# Patient Record
Sex: MALE | Race: WHITE | Employment: FULL TIME | URBAN - METROPOLITAN AREA
[De-identification: names, ages, dates, MRNs, and addresses within clinical notes are randomized per-mention and may not be internally consistent; named-entity substitution may affect disease eponyms.]

---

## 2017-04-04 ENCOUNTER — RADIANT APPOINTMENT (OUTPATIENT)
Dept: GENERAL RADIOLOGY | Facility: CLINIC | Age: 22
End: 2017-04-04
Attending: NURSE PRACTITIONER
Payer: COMMERCIAL

## 2017-04-04 ENCOUNTER — OFFICE VISIT (OUTPATIENT)
Dept: FAMILY MEDICINE | Facility: CLINIC | Age: 22
End: 2017-04-04
Payer: COMMERCIAL

## 2017-04-04 VITALS
DIASTOLIC BLOOD PRESSURE: 81 MMHG | HEART RATE: 77 BPM | OXYGEN SATURATION: 98 % | WEIGHT: 139.2 LBS | SYSTOLIC BLOOD PRESSURE: 128 MMHG | HEIGHT: 72 IN | TEMPERATURE: 98.2 F | BODY MASS INDEX: 18.85 KG/M2

## 2017-04-04 DIAGNOSIS — M25.522 LEFT ELBOW PAIN: Primary | ICD-10-CM

## 2017-04-04 DIAGNOSIS — M25.522 LEFT ELBOW PAIN: ICD-10-CM

## 2017-04-04 DIAGNOSIS — S46.911A MUSCLE STRAIN OF RIGHT UPPER ARM, INITIAL ENCOUNTER: ICD-10-CM

## 2017-04-04 PROCEDURE — 73080 X-RAY EXAM OF ELBOW: CPT | Mod: LT

## 2017-04-04 PROCEDURE — 99203 OFFICE O/P NEW LOW 30 MIN: CPT | Performed by: NURSE PRACTITIONER

## 2017-04-04 RX ORDER — NAPROXEN 500 MG/1
500 TABLET ORAL 2 TIMES DAILY PRN
Qty: 30 TABLET | Refills: 1 | Status: SHIPPED | OUTPATIENT
Start: 2017-04-04 | End: 2017-07-08

## 2017-04-04 RX ORDER — CYCLOBENZAPRINE HCL 10 MG
5-10 TABLET ORAL 3 TIMES DAILY PRN
Qty: 30 TABLET | Refills: 1 | Status: SHIPPED | OUTPATIENT
Start: 2017-04-04 | End: 2017-07-08

## 2017-04-04 NOTE — PATIENT INSTRUCTIONS
I will let you know if the radiologist sees something that I do not. Take naproxen with food for pain. Muscle relaxer if pain is severe- this can make you tired.  I have ordered physical therapy which can help with massage, stretching and strengthening. If your symptoms persist or worsen, we will order further imaging for evaluation, please let me know.      Muscle Strain in the Extremities  A muscle strain is a stretching and tearing of muscle fibers. This causes pain, especially when you move that muscle. There may also be some swelling and bruising.  Home care    Keep the hurt area raised to reduce pain and swelling. This is especially important during the first 48 hours.    Apply an ice pack over the injured area for 15 to 20 minutes every 3 to 6 hours. You should do this for the first 24 to 48 hours. You can make an ice pack by filling a plastic bag that seals at the top with ice cubes and then wrapping it with a thin towel. Be careful not to injure your skin with the ice treatments. Ice should never be applied directly to skin. Continue the use of ice packs for relief of pain and swelling as needed. After 48 hours, apply heat (warm shower or warm bath) for 15 to 20 minutes several times a day, or alternate ice and heat.    You may use over-the-counter pain medicine to control pain, unless another medicine was prescribed. If you have chronic liver or kidney disease or ever had a stomach ulcer or GI bleeding, talk with your healthcare provider before using these medicines.    For leg strains: If crutches have been recommended, don t put full weight on the hurt leg until you can do so without pain. You can return to sports when you are able to hop and run on the injured leg without pain.  Follow-up care  Follow up with your healthcare provider, or as advised.  When to seek medical advice  Call your healthcare provider right away if any of these occur:    The toes of the injured leg become swollen, cold, blue,  numb, or tingly    Pain or swelling increases    4707-8323 [employer]. [address] [place] [address], [place] [address]. All rights reserved. This information is not intended as a substitute for professional medical care. Always follow your healthcare professional's instructions.

## 2017-04-04 NOTE — LETTER
[facility]  [address] [place] [address]  [phone]        April 4, 2017    Regarding:  Sivakmuar Cheri  [address] [facility] [address]              To Whom It May Concern;      Patient was seen in clinic today for an injury.    Restrictions: no lifting with left arm until pain subsides.    If you have questions or concerns please call the clinic.      Sincerely,    CARLOS ALBERTO Glover, FNP-BC/mp

## 2017-04-04 NOTE — LETTER
[facility]  [address]  [place] [address]  [phone]    April 4, 2017       Sivakumar Goodmanjessee  [address]   [place] [address]        Sivakumar     normal elbow xray, please follow treatment plan, follow up if symptoms persist or worsen.      Results for orders placed or performed in visit on 04/04/17   XR Elbow Left G/E 3 Views    Narrative    XR ELBOW LT G/E 3 VW 4/4/2017 12:57 PM    HISTORY: Pain.    COMPARISON: None.      Impression    IMPRESSION: No evidence of acute fracture or malalignment.    ABELINO ANNA MD     If you have any questions or concerns please call the clinic at [phone].    CARLOS ALBERTO Glover, FNP-BC/mp

## 2017-04-04 NOTE — MR AVS SNAPSHOT
After Visit Summary   4/4/2017    Sivakumar Alonzo    MRN: [id number]           Patient Information     Date Of Birth          1995        Visit Information        Provider Department      4/4/2017 12:20 PM Diana Torres NP [facility]        Today's Diagnoses     Left elbow pain    -  1    Muscle strain of right upper arm, initial encounter          Care Instructions    I will let you know if the radiologist sees something that I do not. Take naproxen with food for pain. Muscle relaxer if pain is severe- this can make you tired.  I have ordered physical therapy which can help with massage, stretching and strengthening. If your symptoms persist or worsen, we will order further imaging for evaluation, please let me know.      Muscle Strain in the Extremities  A muscle strain is a stretching and tearing of muscle fibers. This causes pain, especially when you move that muscle. There may also be some swelling and bruising.  Home care    Keep the hurt area raised to reduce pain and swelling. This is especially important during the first 48 hours.    Apply an ice pack over the injured area for 15 to 20 minutes every 3 to 6 hours. You should do this for the first 24 to 48 hours. You can make an ice pack by filling a plastic bag that seals at the top with ice cubes and then wrapping it with a thin towel. Be careful not to injure your skin with the ice treatments. Ice should never be applied directly to skin. Continue the use of ice packs for relief of pain and swelling as needed. After 48 hours, apply heat (warm shower or warm bath) for 15 to 20 minutes several times a day, or alternate ice and heat.    You may use over-the-counter pain medicine to control pain, unless another medicine was prescribed. If you have chronic liver or kidney disease or ever had a stomach ulcer or GI bleeding, talk with your healthcare provider before using these medicines.    For leg strains: If crutches have been  recommended, don t put full weight on the hurt leg until you can do so without pain. You can return to sports when you are able to hop and run on the injured leg without pain.  Follow-up care  Follow up with your healthcare provider, or as advised.  When to seek medical advice  Call your healthcare provider right away if any of these occur:    The toes of the injured leg become swollen, cold, blue, numb, or tingly    Pain or swelling increases    6489-4055 The "[employer] [address] [place], [place] [address]. All rights reserved. This information is not intended as a substitute for professional medical care. Always follow your healthcare professional's instructions.              Follow-ups after your visit        Additional Services     PHYSICAL THERAPY REFERRAL       *This therapy referral will be filtered to a centralized scheduling office at [facility] and the patient will receive a call to schedule an appointment at a [place] location most convenient for them. *     [facility] provides Physical Therapy evaluation and treatment and many specialty services across the [place] system.  If requesting a specialty program, please choose from the list below.    If you have not heard from the scheduling office within 2 business days, please call [phone] for all locations, with the exception of [place], please call [phone].  Treatment: Evaluation & Treatment  Special Instructions/Modalities: Left arm/ inner elbow- muscle strain/ pain  Special Programs: None    Please be aware that coverage of these services is subject to the terms and limitations of your health insurance plan.  Call member services at your health plan with any benefit or coverage questions.      **Note to Provider:  If you are referring outside of [place] for the therapy appointment, please list the name of the location in the  special instructions  above, print the referral and give  "to the patient to schedule the appointment.                  Follow-up notes from your care team     Return if symptoms worsen or fail to improve.      Who to contact     Normal or non-critical lab and imaging results will be communicated to you by MyChart, letter or phone within 4 business days after the clinic has received the results. If you do not hear from us within 7 days, please contact the clinic through MyChart or phone. If you have a critical or abnormal lab result, we will notify you by phone as soon as possible.  Submit refill requests through [employer] or call your pharmacy and they will forward the refill request to us. Please allow 3 business days for your refill to be completed.          If you need to speak with a  for additional information , please call: [phone]             Additional Information About Your Visit        Care EveryWhere ID     This is your Care EveryWhere ID. This could be used by other organizations to access your [facility] medical records  [id number]        Your Vitals Were     Pulse Temperature Height Pulse Oximetry BMI (Body Mass Index)       77 98.2  F (36.8  C) (Oral) 5' 11.85\" (1.825 m) 98% 18.96 kg/m2        Blood Pressure from Last 3 Encounters:   04/04/17 128/81    Weight from Last 3 Encounters:   04/04/17 139 lb 3.2 oz (63.1 kg)              We Performed the Following     PHYSICAL THERAPY REFERRAL          Today's Medication Changes          These changes are accurate as of: 4/4/17 12:56 PM.  If you have any questions, ask your nurse or doctor.               Start taking these medicines.        Dose/Directions    cyclobenzaprine 10 MG tablet   Commonly known as:  FLEXERIL   Used for:  Muscle strain of right upper arm, initial encounter   Started by:  Diana Torres NP        Dose:  5-10 mg   Take 0.5-1 tablets (5-10 mg) by mouth 3 times daily as needed for muscle spasms   Quantity:  30 tablet   Refills:  1       naproxen 500 MG tablet   Commonly " known as:  NAPROSYN   Used for:  Muscle strain of right upper arm, initial encounter   Started by:  Diana Torres NP        Dose:  500 mg   Take 1 tablet (500 mg) by mouth 2 times daily as needed for moderate pain   Quantity:  30 tablet   Refills:  1            Where to get your medicines      These medications were sent to [facility] - [address] [facility]  [address] [facility] [place] [address]     Phone:  [phone]     cyclobenzaprine 10 MG tablet    naproxen 500 MG tablet                Primary Care Provider Office Phone #    [facility] [phone]       No address on file        Thank you!     Thank you for choosing [facility]  for your care. Our goal is always to provide you with excellent care. Hearing back from our patients is one way we can continue to improve our services. Please take a few minutes to complete the written survey that you may receive in the mail after your visit with us. Thank you!             Your Updated Medication List - Protect others around you: Learn how to safely use, store and throw away your medicines at www.disposemymeds.org.          This list is accurate as of: 4/4/17 12:56 PM.  Always use your most recent med list.                   Brand Name Dispense Instructions for use    ALLEGRA PO          cyclobenzaprine 10 MG tablet    FLEXERIL    30 tablet    Take 0.5-1 tablets (5-10 mg) by mouth 3 times daily as needed for muscle spasms       naproxen 500 MG tablet    NAPROSYN    30 tablet    Take 1 tablet (500 mg) by mouth 2 times daily as needed for moderate pain

## 2017-04-04 NOTE — PROGRESS NOTES
Please call the patient with the results: normal elbow xray, please follow treatment plan, follow up if symptoms persist or worsen.    YUMIKO Dunaway

## 2017-04-04 NOTE — NURSING NOTE
"Chief Complaint   Patient presents with     Musculoskeletal Problem       Initial /81  Pulse 77  Temp 98.2  F (36.8  C) (Oral)  Ht 5' 11.85\" (1.825 m)  Wt 139 lb 3.2 oz (63.1 kg)  SpO2 98%  BMI 18.96 kg/m2 Estimated body mass index is 18.96 kg/(m^2) as calculated from the following:    Height as of this encounter: 5' 11.85\" (1.825 m).    Weight as of this encounter: 139 lb 3.2 oz (63.1 kg).  Medication Reconciliation: complete     Eunice Reeves MA  "

## 2017-07-08 ENCOUNTER — NURSE TRIAGE (OUTPATIENT)
Dept: NURSING | Facility: CLINIC | Age: 22
End: 2017-07-08

## 2017-07-08 ENCOUNTER — OFFICE VISIT (OUTPATIENT)
Dept: URGENT CARE | Facility: URGENT CARE | Age: 22
End: 2017-07-08
Payer: COMMERCIAL

## 2017-07-08 VITALS
HEART RATE: 106 BPM | WEIGHT: 127 LBS | BODY MASS INDEX: 17.3 KG/M2 | SYSTOLIC BLOOD PRESSURE: 114 MMHG | DIASTOLIC BLOOD PRESSURE: 75 MMHG | TEMPERATURE: 101.9 F

## 2017-07-08 DIAGNOSIS — Z91.89 AT HIGH RISK FOR TICK BORNE ILLNESS: ICD-10-CM

## 2017-07-08 DIAGNOSIS — J20.9 ACUTE BRONCHITIS WITH SYMPTOMS > 10 DAYS: Primary | ICD-10-CM

## 2017-07-08 DIAGNOSIS — R50.9 FEBRILE ILLNESS, ACUTE: ICD-10-CM

## 2017-07-08 PROCEDURE — 99213 OFFICE O/P EST LOW 20 MIN: CPT | Performed by: FAMILY MEDICINE

## 2017-07-08 RX ORDER — DOXYCYCLINE 100 MG/1
100 CAPSULE ORAL 2 TIMES DAILY WITH MEALS
Qty: 20 CAPSULE | Refills: 0 | Status: SHIPPED | OUTPATIENT
Start: 2017-07-08 | End: 2017-07-18

## 2017-07-08 ASSESSMENT — PAIN SCALES - GENERAL: PAINLEVEL: NO PAIN (0)

## 2017-07-08 NOTE — PROGRESS NOTES
SUBJECTIVE:                                                    Sivakumar Alonzo is a 22 year old male who presents to clinic today for the following health issues:      cough      Duration: 2wks    Description (location/character/radiation):     Intensity:  moderate    Accompanying signs and symptoms: none    History (similar episodes/previous evaluation): None    Precipitating or alleviating factors: None    Therapies tried and outcome: None     No thoughts of harming self or others     Accompanied by mom    Started 2 weeks ago with intially a mild cough and a sore throat for a few days  Thought got better a little bit but then got worse again after a few days  Cough persistent  Today complained of chills. Mom checked temperature and had fever  Patient was brought in to be seen    Non smoker.    [place] were [place] the [place] couple of weeks ago.   He cannot recall any tick bite.     Sinus congestion/sinus pain No  Wheezing: No  Chest pain or exertional shortness of breath: NO   Exposure to pertussis or pertussis like symptoms: No  Orthopnea, worsening edema, pnd: NO  Rash: NO  Tried OTC medications without relief  No hemoptysis.  Worsening symptoms hence patient came in to be seen     Problem list and histories reviewed & adjusted, as indicated.  Additional history: as documented    Problem list, Medication list, Allergies, and Medical/Social/Surgical histories reviewed in [facility] and updated as appropriate.    ROS:  Constitutional, HEENT, cardiovascular, pulmonary, gi and gu systems are negative, except as otherwise noted.    OBJECTIVE:                                                    /75  Pulse 106  Temp 101.9  F (38.8  C) (Tympanic)  Wt 127 lb (57.6 kg)  BMI 17.3 kg/m2  Body mass index is 17.3 kg/(m^2).  GENERAL: healthy, alert and no distress  EYES: pink palpebral conjunctiva, anicteric sclera  ENT: midline nasal septum normal ear exam. congested sinuses.   Mouth: moist buccal mucosa  nonhyperemic posterior pharyngeal wall. No tonsillar enlargement or cellulitis  NECK: mild anterior cervical, no asymmetry, masses, or scars and thyroid normal to palpation  RESP: lungs clear to auscultation - No  rales, rhonchi or wheezes    CV: regular rate and rhythm, normal S1 S2, no S3 or S4,  No murmurs, click or rub  SKIN: no visible rashes noted  Pscyh: Appropriate mood and affect  MS: no gross musculoskeletal defects noted    Diagnostic Test Results:  none      ASSESSMENT/PLAN:                                                        ICD-10-CM    1. Acute bronchitis with symptoms > 10 days J20.9 doxycycline Monohydrate 100 MG CAPS   2. Febrile illness, acute R50.9 doxycycline Monohydrate 100 MG CAPS   3. At high risk for tick borne illness Z91.89      Bronchitis double sickening phenomenon > 10 days now with fevers concern for bacterial respiratory infection  However also increased risk of tick borne illness given recent camping  Offered and recommended chest xray and additional labs patient declined and opted for empiric treatment   Given allergy profile, lung respiratory infection and risk for tick borne illness chose doxycycline as antibiotic of choice  Prescribed with doxycycline  Aware of the risks of GI intolerance, GERD, GI complications and photosensitivity with doxycycline.   recommend chest xray, offered today, declined.   Adverse reactions of medications discussed.  Over the counter medications discussed.   Aware to come back in if with worsening symptoms or if no relief despite treatment plan  Patient voiced understanding and had no further questions.     MD Bonnie Antunez MD  [facility]

## 2017-07-08 NOTE — MR AVS SNAPSHOT
"              After Visit Summary   7/8/2017    Sivakumar Alonzo    MRN: [id number]           Patient Information     Date Of Birth          1995        Visit Information        Provider Department      7/8/2017 1:05 PM Bonnie Dougherty MD [facility]        Today's Diagnoses     Acute bronchitis with symptoms > 10 days    -  1    Febrile illness, acute        At high risk for tick borne illness           Follow-ups after your visit        Your next 10 appointments already scheduled     Jul 10, 2017 11:30 AM CDT   New Visit with Celena Coates OD   [facility] ([facility])    [address] [place] [address] [phone]              Who to contact     If you have questions or need follow up information about today's clinic visit or your schedule please contact [facility] directly at [phone].  Normal or non-critical lab and imaging results will be communicated to you by MyChart, letter or phone within 4 business days after the clinic has received the results. If you do not hear from us within 7 days, please contact the clinic through MyChart or phone. If you have a critical or abnormal lab result, we will notify you by phone as soon as possible.  Submit refill requests through [employer] or call your pharmacy and they will forward the refill request to us. Please allow 3 business days for your refill to be completed.          Additional Information About Your Visit        MyChart Information     [employer] lets you send messages to your doctor, view your test results, renew your prescriptions, schedule appointments and more. To sign up, go to [email or web address] . Click on \"Log in\" on the left side of the screen, which will take you to the Welcome page. Then click on \"Sign up Now\" on the right side of the page.     You will be asked to enter the access code listed below, as well as some personal information. Please follow the " directions to create your username and password.     Your access code is: [email or web address]  Expires: 10/6/2017  1:36 PM     Your access code will  in 90 days. If you need help or a new code, please call your [facility] or [phone].        Care EveryWhere ID     This is your Care EveryWhere ID. This could be used by other organizations to access your [facility] medical records  [id number]        Your Vitals Were     Pulse Temperature BMI (Body Mass Index)             106 101.9  F (38.8  C) (Tympanic) 17.3 kg/m2          Blood Pressure from Last 3 Encounters:   17 114/75   17 128/81    Weight from Last 3 Encounters:   17 127 lb (57.6 kg)   17 139 lb 3.2 oz (63.1 kg)              Today, you had the following     No orders found for display         Today's Medication Changes          These changes are accurate as of: 17  1:36 PM.  If you have any questions, ask your nurse or doctor.               Start taking these medicines.        Dose/Directions    doxycycline Monohydrate 100 MG Caps   Used for:  Acute bronchitis with symptoms > 10 days, Febrile illness, acute   Started by:  Bonnie Dougherty MD        Dose:  100 mg   Take 1 capsule (100 mg) by mouth 2 times daily (with meals) for 10 days Increases risk of heartburn and also sun sensitivity or sun burn.   Quantity:  20 capsule   Refills:  0            Where to get your medicines      [id number] [address] [facility]  [address] [place] [address]    Hours:  24-hours Phone:  [phone]     doxycycline Monohydrate 100 MG Caps                Primary Care Provider Office Phone #    [facility] [phone]       No address on file        Equal Access to Services     SAMUEL RAMOS AH: Jacqueline Prescott, waaxda luqadaha, qaybta kaalmada omar, janey marte.  [facility] [phone].    ATENCIÓN: Si shawanda gudino, tiene a canela disposición servicios gratuitos de asistencia lingüística. Sagar dudley [phone].    We comply with applicable federal civil rights laws and [place] laws. We do not discriminate on the basis of race, color, national origin, age, disability sex, sexual orientation or gender identity.            Thank you!     [facility] you [facility]  [facility]. Our goal is always to provide you with excellent care. Hearing back from our patients is one way we can continue to improve our services. Please take a few minutes to complete the written survey that you may receive in the mail after your visit with us. Thank you!             Your Updated Medication List - Protect others around you: Learn how to safely use, store and throw away your medicines at www.disposemymeds.org.          This list is accurate as of: 7/8/17  1:36 PM.  Always use your most recent med list.                   Brand Name Dispense Instructions for use Diagnosis    ALLEGRA PO           doxycycline Monohydrate 100 MG Caps     20 capsule    Take 1 capsule (100 mg) by mouth 2 times daily (with meals) for 10 days Increases risk of heartburn and also sun sensitivity or sun burn.    Acute bronchitis with symptoms > 10 days, Febrile illness, acute

## 2017-07-08 NOTE — NURSING NOTE
"Chief Complaint   Patient presents with     Cough       Initial /75  Pulse 106  Temp 101.9  F (38.8  C) (Tympanic)  Wt 127 lb (57.6 kg)  BMI 17.3 kg/m2 Estimated body mass index is 17.3 kg/(m^2) as calculated from the following:    Height as of 4/4/17: 5' 11.85\" (1.825 m).    Weight as of this encounter: 127 lb (57.6 kg).  Medication Reconciliation: complete  Ibis Pollard CMA      "

## 2018-01-06 ENCOUNTER — OFFICE VISIT (OUTPATIENT)
Dept: URGENT CARE | Facility: URGENT CARE | Age: 23
End: 2018-01-06
Payer: COMMERCIAL

## 2018-01-06 VITALS
TEMPERATURE: 101 F | OXYGEN SATURATION: 97 % | WEIGHT: 130 LBS | SYSTOLIC BLOOD PRESSURE: 120 MMHG | HEART RATE: 95 BPM | BODY MASS INDEX: 17.7 KG/M2 | DIASTOLIC BLOOD PRESSURE: 68 MMHG

## 2018-01-06 DIAGNOSIS — J02.0 STREP THROAT: ICD-10-CM

## 2018-01-06 DIAGNOSIS — J02.9 SORETHROAT: Primary | ICD-10-CM

## 2018-01-06 LAB
DEPRECATED S PYO AG THROAT QL EIA: ABNORMAL
SPECIMEN SOURCE: ABNORMAL

## 2018-01-06 PROCEDURE — 99213 OFFICE O/P EST LOW 20 MIN: CPT | Performed by: FAMILY MEDICINE

## 2018-01-06 PROCEDURE — 87880 STREP A ASSAY W/OPTIC: CPT | Performed by: FAMILY MEDICINE

## 2018-01-06 RX ORDER — AZITHROMYCIN 250 MG/1
TABLET, FILM COATED ORAL
Qty: 6 TABLET | Refills: 0 | Status: SHIPPED | OUTPATIENT
Start: 2018-01-06 | End: 2018-01-28

## 2018-01-06 NOTE — MR AVS SNAPSHOT
"              After Visit Summary   2018    Sivakumar Alonzo    MRN: [id number]           Patient Information     Date Of Birth          1995        Visit Information        Provider Department      2018 11:30 AM Bonnie Dougherty MD [facility]        Today's Diagnoses     Sorethroat    -  1    Strep throat           Follow-ups after your visit        Who to contact     If you have questions or need follow up information about today's clinic visit or your schedule please contact [facility] directly at [phone].  Normal or non-critical lab and imaging results will be communicated to you by [employer], letter or phone within 4 business days after the clinic has received the results. If you do not hear from us within 7 days, please contact the clinic through [employer] or phone. If you have a critical or abnormal lab result, we will notify you by phone as soon as possible.  Submit refill requests through [employer] or call your pharmacy and they will forward the refill request to us. Please allow 3 business days for your refill to be completed.          Additional Information About Your Visit        MyChart Information     [employer] lets you send messages to your doctor, view your test results, renew your prescriptions, schedule appointments and more. To sign up, go to [email or web address] . Click on \"Log in\" on the left side of the screen, which will take you to the Welcome page. Then click on \"Sign up Now\" on the right side of the page.     You will be asked to enter the access code listed below, as well as some personal information. Please follow the directions to create your username and password.     Your access code is: [email or web address]  Expires: 2018 12:09 PM     Your access code will  in 90 days. If you need help or a new code, please call your [facility] or [phone].        Care EveryWhere ID     This is your Care EveryWhere ID. This could be used by " other organizations to access your [place] medical records  [id number]        Your Vitals Were     Pulse Temperature Pulse Oximetry BMI (Body Mass Index)          95 101  F (38.3  C) (Tympanic) 97% 17.7 kg/m2         Blood Pressure from Last 3 Encounters:   01/06/18 120/68   07/08/17 114/75   04/04/17 128/81    Weight from Last 3 Encounters:   01/06/18 130 lb (59 kg)   07/08/17 127 lb (57.6 kg)   04/04/17 139 lb 3.2 oz (63.1 kg)              We Performed the Following     Strep, Rapid Screen          Today's Medication Changes          These changes are accurate as of: 1/6/18 12:09 PM.  If you have any questions, ask your nurse or doctor.               Start taking these medicines.        Dose/Directions    azithromycin 250 MG tablet   Commonly known as:  ZITHROMAX   Used for:  Strep throat   Started by:  Bonnie Dougherty MD        2 tablets the first day, then 1 tablet daily for the next 4 days   Quantity:  6 tablet   Refills:  0            Where to get your medicines      These medications were sent to [id number] - [facility] [address] [facility]  [address] [facility] [address]    Hours:  24-hours Phone:  [phone]     azithromycin 250 MG tablet                Primary Care Provider Office Phone # Fax #    [facility] [phone] [address] [facility] [address]        Equal Access to Services     SAMUEL RAMOS AH: Hadii aad ku hadasho Soomaali, waaxda luqadaha, qaybta kaalmada adeegyada, waxjean saima marte. So [facility] [phone].    ATENCIÓN: Si habla shahab, tiene a canela disposición servicios gratuitos de asistencia lingüística. Llame al [phone].    We comply with applicable federal civil rights laws and [place] laws. We do not discriminate on the basis of race, color, national origin, age, disability, sex, sexual orientation, or gender identity.            Thank  you!     Thank you for choosing [facility]  for your care. Our goal is always to provide you with excellent care. Hearing back from our patients is one way we can continue to improve our services. Please take a few minutes to complete the written survey that you may receive in the mail after your visit with us. Thank you!             Your Updated Medication List - Protect others around you: Learn how to safely use, store and throw away your medicines at www.disposemymeds.org.          This list is accurate as of: 1/6/18 12:09 PM.  Always use your most recent med list.                   Brand Name Dispense Instructions for use Diagnosis    ALLEGRA PO           azithromycin 250 MG tablet    ZITHROMAX    6 tablet    2 tablets the first day, then 1 tablet daily for the next 4 days    Strep throat

## 2018-01-06 NOTE — LETTER
[facility]  [address] [facility] [address]  Phone: [phone]    January 6, 2018        Sivakumar Alonzo  [address]  [facility] [address]          To whom it may concern:    RE: Sivakumar Alonzo    Patient was seen and treated today at our clinic and missed work.  Recommend staying home today and tomorrow.  May go back on Monday 1/8 or later  if no more fevers for 24 hours without the help of tylenol or ibuprofen.     Please contact me for questions or concerns.      Sincerely,        Bonnie Dougherty MD

## 2018-01-06 NOTE — PROGRESS NOTES
SUBJECTIVE:                                                    Sivakumar Alonzo is a 22 year old male who presents to clinic today for the following health issues:      RESPIRATORY SYMPTOMS      Duration: 2 days    Description  sore throat and cough    Severity: moderate    Accompanying signs and symptoms: None    History (predisposing factors):  none    Precipitating or alleviating factors: None    Therapies tried and outcome:  none      No thoughts of harming self or others   Was just mild 2 days ago  Yesterday got progressively worse  Fever started as well  fever  - Yes  cough  -No  ill contacts - No  able to swallow liquids and solids -YES  other symptoms above  Rash: No  Has tried over the counter medications no relief  because of persistence, patient came in to be seen.    ROS:  denies any exertional chest pain or shortness of breath  denies any unusual rash or joint swelling  denies post-tussive emesis or pertussis like symptoms  Negative for constitutional, eye, ear, nose, throat, skin, respiratory, cardiac, and gastrointestinal other than those outlined in the HPI.    PMH: chart reviewed  FH: chart reviewed    SH: chart reviewed and as above   Physical Exam:   /68  Pulse 95  Temp 101  F (38.3  C) (Tympanic)  Wt 130 lb (59 kg)  SpO2 97%  BMI 17.7 kg/m2  General : Awake Alert not in any acute cardiorespiratory distress  Head:       Normocephalic Atraumatic  Eyes:    Pupils equally reactive to light and accomodation. Sclera not icteric.   ENT:   midline nasal septum, mild nasal congestion, sinuses non-tender  left ear: no tragal tenderness, no mastoid tenderness, normal EAC, normal TM  right ear: left ear: no tragal tenderness, no mastoid tenderness, normal EAC, normal TM  mouth moist buccal mucosa, Yes hyperemic posterior pharyngeal wall, no trismus  tonsils: bilateral tonsil abnormal with erythematous grade 1 no exudate  anterior cervical nodes: Yes tender  posterior cervical nodes: No   palpable  Heart:  Regular in rate and rhythm, no murmurs rubs or gallops  Lungs: Symmetrical Chest Expansion, no retractions, clear breath sounds  Abdomen: soft, no hepatosplenomegally  Psych: Appropriate mood and affect. Pleasant  Skin: patient undressed to level of his/her comfort. No visible concerning lesions.    Labs: Strep positive     ICD-10-CM    1. Sorethroat J02.9 Strep, Rapid Screen   2. Strep throat J02.0 azithromycin (ZITHROMAX) 250 MG tablet     Prescribed with zithromax   supportive treatment: advised supportive treatment, Advised to come back in if with any worsening symptoms or if not better despite supportive measures. Especially if with any worsening sore throat, inability to eat or drink or swallow, or trismus. Symptoms of peritonsillar abscess discussed. Patient voiced understanding.  adverse reactions of medication discussed  OTC medications discussed  advised to come back in right away if with any worsening symptoms or if with no relief despite treatment plan  patient voiced understanding and had no further questions at this time.

## 2018-01-28 ENCOUNTER — OFFICE VISIT (OUTPATIENT)
Dept: URGENT CARE | Facility: URGENT CARE | Age: 23
End: 2018-01-28
Payer: COMMERCIAL

## 2018-01-28 VITALS
OXYGEN SATURATION: 95 % | DIASTOLIC BLOOD PRESSURE: 84 MMHG | TEMPERATURE: 98.3 F | SYSTOLIC BLOOD PRESSURE: 139 MMHG | HEART RATE: 98 BPM | BODY MASS INDEX: 18.25 KG/M2 | WEIGHT: 134 LBS

## 2018-01-28 DIAGNOSIS — R68.89 FLU-LIKE SYMPTOMS: Primary | ICD-10-CM

## 2018-01-28 PROCEDURE — 99213 OFFICE O/P EST LOW 20 MIN: CPT | Performed by: NURSE PRACTITIONER

## 2018-01-28 RX ORDER — OSELTAMIVIR PHOSPHATE 75 MG/1
75 CAPSULE ORAL 2 TIMES DAILY
Qty: 10 CAPSULE | Refills: 0 | Status: SHIPPED | OUTPATIENT
Start: 2018-01-28 | End: 2018-02-02

## 2018-01-28 RX ORDER — FEXOFENADINE HCL 180 MG/1
180 TABLET ORAL
COMMUNITY
Start: 2010-10-09 | End: 2023-05-16

## 2018-01-28 NOTE — LETTER
[facility]  [address]  [facility] [address]  Phone: [phone]    January 28, 2018        Sivakumar Alonzo  [address] [facility] [address]          To whom it may concern:    RE: Sivakumar Alonzo    Patient was seen and treated today at our clinic. Please allow him to rest home 1/29/2018.   Patient may return to work 1/30/2018  with no restrictions    Please contact me for questions or concerns.      Sincerely,        Zita Sanford NP

## 2018-01-28 NOTE — PATIENT INSTRUCTIONS
The Flu (Influenza)     The virus that causes the flu spreads through the air in droplets when someone who has the flu coughs, sneezes, laughs, or talks.   The flu (influenza) is an infection that affects your respiratory tract. This tract is made up of your mouth, nose, and lungs, and the passages between them. Unlike a cold, the flu can make you very ill. And it can lead to pneumonia, a serious lung infection. The flu can have serious complications and even cause death.  Who is at risk for the flu?  Anyone can get the flu. But you are more likely to become infected if you:    Have a weakened immune system    Work in a healthcare setting where you may be exposed to flu germs    Live or work with someone who has the flu    Haven t had an annual flu shot  How does the flu spread?  The flu is caused by a virus. The virus spreads through the air in droplets when someone who has the flu coughs, sneezes, laughs, or talks. You can become infected when you inhale these viruses directly. You can also become infected when you touch a surface on which the droplets have landed and then transfer the germs to your eyes, nose, or mouth. Touching used tissues, or sharing utensils, drinking glasses, or a toothbrush from an infected person can expose you to flu viruses, too.  What are the symptoms of the flu?  Flu symptoms tend to come on quickly and may last a few days to a few weeks. They include:    Fever usually higher than 100.4 F  (38 C) and chills    Sore throat and headache    Dry cough    Runny nose    Tiredness and weakness    Muscle aches  Who is at risk for flu complications?  For some people, the flu can be very serious. The risk for complications is greater for:    Children younger than age 5    Adults ages 65 and older    People with a chronic illness such as diabetes or heart, kidney, or lung disease    People who live in a nursing home or long-term care facility   How is the flu treated?  The flu usually gets  better after 7 days or so. In some cases, your healthcare provider may prescribe an antiviral medicine. This may help you get well a little sooner. For the medicine to help, you need to take it as soon as possible (ideally within 48 hours) after your symptoms start. If you develop pneumonia or other serious illness, you may need to stay in the hospital.  Easing flu symptoms    Drink lots of fluids such as water, juice, and warm soup. A good rule is to drink enough so that you urinate your normal amount.    Get plenty of rest.    Ask your healthcare provider what to take for fever and pain.    Call your provider if your fever is 100.4 F (38 C) or higher, or you become dizzy, lightheaded, or short of breath.  Taking steps to protect others    Wash your hands often, especially after coughing or sneezing. Or clean your hands with an alcohol-based hand  containing at least 60% alcohol.    Cough or sneeze into a tissue. Then throw the tissue away and wash your hands. If you don t have a tissue, cough and sneeze into your elbow.    Stay home until at least 24 hours after you no longer have a fever or chills. Be sure the fever isn t being hidden by fever-reducing medicine.    Don t share food, utensils, drinking glasses, or a toothbrush with others.    Ask your healthcare provider if others in your household should get antiviral medicine to help them avoid infection.  How can the flu be prevented?    One of the best ways to avoid the flu is to get a flu vaccine each year. The virus that causes the flu changes from year to year. For that reason, healthcare providers recommend getting the flu vaccine each year, as soon as it's available in your area. The vaccine is given as a shot. Your healthcare provider can tell you which vaccine is right for you. A nasal spray is also available but is not recommended for the 4668-9653 flu season. The CDC says this is because the nasal spray did not seem to protect against the flu  over the last several flu seasons. In the past, it was meant for people ages 2 to 49.    Wash your hands often. Frequent handwashing is a proven way to help prevent infection.    Carry an alcohol-based hand gel containing at least 60% alcohol. Use it when you can't use soap and water. Then wash your hands as soon as you can.    Avoid touching your eyes, nose, and mouth.    At home and work, clean phones, computer keyboards, and toys often with disinfectant wipes.    If possible, avoid close contact with others who have the flu or symptoms of the flu.  Handwashing tips  Handwashing is one of the best ways to prevent many common infections. If you are caring for or visiting someone with the flu, wash your hands each time you enter and leave the room. Follow these steps:    Use warm water and plenty of soap. Rub your hands together well.    Clean the whole hand, including under your nails, between your fingers, and up the wrists.    Wash for at least 15 seconds.    Rinse, letting the water run down your fingers, not up your wrists.    Dry your hands well. Use a paper towel to turn off the faucet and open the door.  Using alcohol-based hand   Alcohol-based hand  are also a good choice. Use them when you can't use soap and water. Follow these steps:    Squeeze about a tablespoon of gel into the palm of one hand.    Rub your hands together briskly, cleaning the backs of your hands, the palms, between your fingers, and up the wrists.    Rub until the gel is gone and your hands are completely dry.  Preventing the flu in healthcare settings  The flu is a special concern for people in hospitals and long-term care facilities. To help prevent the spread of flu, many hospitals and nursing homes take these steps:    Healthcare providers wash their hands or use an alcohol-based hand  before and after treating each patient.    People with the flu have private rooms and bathrooms or share a room with someone  with the same infection.    People who are at high risk for the flu but don't have it are encouraged to get the flu and pneumonia vaccines.    All healthcare workers are encouraged or required to get flu shots.   Date Last Reviewed: 12/1/2016 2000-2017 [employer]. [address] [place] [address]. All rights reserved. This information is not intended as a substitute for professional medical care. Always follow your healthcare professional's instructions.

## 2018-01-28 NOTE — NURSING NOTE
"Chief Complaint   Patient presents with     Flu Symptoms     1-1.5 weeks of cough       Initial /84 (BP Location: Left arm, Patient Position: Chair, Cuff Size: Adult Large)  Pulse 98  Temp 98.3  F (36.8  C) (Oral)  Wt 134 lb (60.8 kg)  SpO2 95%  BMI 18.25 kg/m2 Estimated body mass index is 18.25 kg/(m^2) as calculated from the following:    Height as of 4/4/17: 5' 11.85\" (1.825 m).    Weight as of this encounter: 134 lb (60.8 kg).  Medication Reconciliation: complete   Lubna Minor CMA      "

## 2018-01-28 NOTE — MR AVS SNAPSHOT
After Visit Summary   1/28/2018    Sivakumar Alonzo    MRN: [id number]           Patient Information     Date Of Birth          1995        Visit Information        Provider Department      1/28/2018 3:50 PM Zita Sanford NP [facility]        Today's Diagnoses     Flu-like symptoms    -  1      Care Instructions      The Flu (Influenza)     The virus that causes the flu spreads through the air in droplets when someone who has the flu coughs, sneezes, laughs, or talks.   The flu (influenza) is an infection that affects your respiratory tract. This tract is made up of your mouth, nose, and lungs, and the passages between them. Unlike a cold, the flu can make you very ill. And it can lead to pneumonia, a serious lung infection. The flu can have serious complications and even cause death.  Who is at risk for the flu?  Anyone can get the flu. But you are more likely to become infected if you:    Have a weakened immune system    Work in a healthcare setting where you may be exposed to flu germs    Live or work with someone who has the flu    Haven t had an annual flu shot  How does the flu spread?  The flu is caused by a virus. The virus spreads through the air in droplets when someone who has the flu coughs, sneezes, laughs, or talks. You can become infected when you inhale these viruses directly. You can also become infected when you touch a surface on which the droplets have landed and then transfer the germs to your eyes, nose, or mouth. Touching used tissues, or sharing utensils, drinking glasses, or a toothbrush from an infected person can expose you to flu viruses, too.  What are the symptoms of the flu?  Flu symptoms tend to come on quickly and may last a few days to a few weeks. They include:    Fever usually higher than 100.4 F  (38 C) and chills    Sore throat and headache    Dry cough    Runny nose    Tiredness and weakness    Muscle aches  Who is at risk for flu  complications?  For some people, the flu can be very serious. The risk for complications is greater for:    Children younger than age 5    Adults ages 65 and older    People with a chronic illness such as diabetes or heart, kidney, or lung disease    People who live in a nursing home or long-term care facility   How is the flu treated?  The flu usually gets better after 7 days or so. In some cases, your healthcare provider may prescribe an antiviral medicine. This may help you get well a little sooner. For the medicine to help, you need to take it as soon as possible (ideally within 48 hours) after your symptoms start. If you develop pneumonia or other serious illness, you may need to stay in the hospital.  Easing flu symptoms    Drink lots of fluids such as water, juice, and warm soup. A good rule is to drink enough so that you urinate your normal amount.    Get plenty of rest.    Ask your healthcare provider what to take for fever and pain.    Call your provider if your fever is 100.4 F (38 C) or higher, or you become dizzy, lightheaded, or short of breath.  Taking steps to protect others    Wash your hands often, especially after coughing or sneezing. Or clean your hands with an alcohol-based hand  containing at least 60% alcohol.    Cough or sneeze into a tissue. Then throw the tissue away and wash your hands. If you don t have a tissue, cough and sneeze into your elbow.    Stay home until at least 24 hours after you no longer have a fever or chills. Be sure the fever isn t being hidden by fever-reducing medicine.    Don t share food, utensils, drinking glasses, or a toothbrush with others.    Ask your healthcare provider if others in your household should get antiviral medicine to help them avoid infection.  How can the flu be prevented?    One of the best ways to avoid the flu is to get a flu vaccine each year. The virus that causes the flu changes from year to year. For that reason, healthcare  providers recommend getting the flu vaccine each year, as soon as it's available in your area. The vaccine is given as a shot. Your healthcare provider can tell you which vaccine is right for you. A nasal spray is also available but is not recommended for the 2517-1951 flu season. The CDC says this is because the nasal spray did not seem to protect against the flu over the last several flu seasons. In the past, it was meant for people ages 2 to 49.    Wash your hands often. Frequent handwashing is a proven way to help prevent infection.    Carry an alcohol-based hand gel containing at least 60% alcohol. Use it when you can't use soap and water. Then wash your hands as soon as you can.    Avoid touching your eyes, nose, and mouth.    At home and work, clean phones, computer keyboards, and toys often with disinfectant wipes.    If possible, avoid close contact with others who have the flu or symptoms of the flu.  Handwashing tips  Handwashing is one of the best ways to prevent many common infections. If you are caring for or visiting someone with the flu, wash your hands each time you enter and leave the room. Follow these steps:    Use warm water and plenty of soap. Rub your hands together well.    Clean the whole hand, including under your nails, between your fingers, and up the wrists.    Wash for at least 15 seconds.    Rinse, letting the water run down your fingers, not up your wrists.    Dry your hands well. Use a paper towel to turn off the faucet and open the door.  Using alcohol-based hand   Alcohol-based hand  are also a good choice. Use them when you can't use soap and water. Follow these steps:    Squeeze about a tablespoon of gel into the palm of one hand.    Rub your hands together briskly, cleaning the backs of your hands, the palms, between your fingers, and up the wrists.    Rub until the gel is gone and your hands are completely dry.  Preventing the flu in healthcare settings  The flu  "is a special concern for people in hospitals and long-term care facilities. To help prevent the spread of flu, many hospitals and nursing homes take these steps:    Healthcare providers wash their hands or use an alcohol-based hand  before and after treating each patient.    People with the flu have private rooms and bathrooms or share a room with someone with the same infection.    People who are at high risk for the flu but don't have it are encouraged to get the flu and pneumonia vaccines.    All healthcare workers are encouraged or required to get flu shots.   Date Last Reviewed: 12/1/2016 2000-2017 [employer]. [address] [place] [address]. All rights reserved. This information is not intended as a substitute for professional medical care. Always follow your healthcare professional's instructions.                Follow-ups after your visit        Who to contact     If you have questions or need follow up information about today's clinic visit or your schedule please contact [facility] directly at [phone].  Normal or non-critical lab and imaging results will be communicated to you by Bazaarvoicehart, letter or phone within 4 business days after the clinic has received the results. If you do not hear from us within 7 days, please contact the clinic through OneFoldt or phone. If you have a critical or abnormal lab result, we will notify you by phone as soon as possible.  Submit refill requests through MoBank or call your pharmacy and they will forward the refill request to us. Please allow 3 business days for your refill to be completed.          Additional Information About Your Visit        MoBank Information     MoBank lets you send messages to your doctor, view your test results, renew your prescriptions, schedule appointments and more. To sign up, go to [email or web address] . Click on \"Log in\" on the left side of the screen, which will take you to " "the Welcome page. Then click on \"Sign up Now\" on the right side of the page.     You will be asked to enter the access code listed below, as well as some personal information. Please follow the directions to create your username and password.     Your access code is: [email or web address]  Expires: 2018 12:09 PM     Your access code will  in 90 days. If you need help or a new code, please call your [facility] or [phone].        Care EveryWhere ID     This is your Care EveryWhere ID. This could be used by other organizations to access your [place] medical records  [id number]        Your Vitals Were     Pulse Temperature Pulse Oximetry BMI (Body Mass Index)          98 98.3  F (36.8  C) (Oral) 95% 18.25 kg/m2         Blood Pressure from Last 3 Encounters:   18 139/84   18 120/68   17 114/75    Weight from Last 3 Encounters:   18 134 lb (60.8 kg)   18 130 lb (59 kg)   17 127 lb (57.6 kg)              Today, you had the following     No orders found for display         Today's Medication Changes          These changes are accurate as of 18  4:14 PM.  If you have any questions, ask your nurse or doctor.               Start taking these medicines.        Dose/Directions    oseltamivir 75 MG capsule   Commonly known as:  TAMIFLU   Used for:  Flu-like symptoms   Started by:  Zita Sanford NP        Dose:  75 mg   Take 1 capsule (75 mg) by mouth 2 times daily for 5 days   Quantity:  10 capsule   Refills:  0            Where to get your medicines      These medications were sent to [id number] - [place] - [address] [facility]  [address] [facility] [place] [address]    Hours:  24-hours Phone:  [phone]     oseltamivir 75 MG capsule                Primary Care Provider Office Phone # Fax #    [place] [facility] [phone] [address] [place] [address]        Equal Access " to Services     SAMUEL RAMOS : Jacqueline Prescott, zahida bridges, qajaney pradhan. So [facility] [phone].    ATENCIÓN: Si habla español, tiene a canela disposición servicios gratuitos de asistencia lingüística. Llame al [phone].    We comply with applicable federal civil rights laws and [place] laws. We do not discriminate on the basis of race, color, national origin, age, disability, sex, sexual orientation, or gender identity.            Thank you!     Thank you for choosing [facility]  for your care. Our goal is always to provide you with excellent care. Hearing back from our patients is one way we can continue to improve our services. Please take a few minutes to complete the written survey that you may receive in the mail after your visit with us. Thank you!             Your Updated Medication List - Protect others around you: Learn how to safely use, store and throw away your medicines at www.disposemymeds.org.          This list is accurate as of 1/28/18  4:14 PM.  Always use your most recent med list.                   Brand Name Dispense Instructions for use Diagnosis    fexofenadine 180 MG tablet    ALLEGRA     Take 180 mg by mouth        oseltamivir 75 MG capsule    TAMIFLU    10 capsule    Take 1 capsule (75 mg) by mouth 2 times daily for 5 days    Flu-like symptoms

## 2018-01-28 NOTE — PROGRESS NOTES
SUBJECTIVE:   Sivakumar Alonzo is a 22 year old male who presents to clinic today for the following health issues:      RESPIRATORY SYMPTOMS      Duration:  this morning    Description  nasal congestion, rhinorrhea, cough, fever, chills, headache, fatigue/malaise and myalgias    Severity: moderate    Accompanying signs and symptoms: None    History (predisposing factors):  none    Precipitating or alleviating factors: Brother and mother had influenza    Therapies tried and outcome:  acetaminophen        Allergies   Allergen Reactions     Amoxicillin      Cefzil [Cefprozil]      Codeine      Extremely sensitive to this drug - be careful with dose     Nystatin        No past medical history on file.      No current outpatient prescriptions on file prior to visit.  No current facility-administered medications on file prior to visit.     Social History   Substance Use Topics     Smoking status: Never Smoker     Smokeless tobacco: Never Used     Alcohol use No       ROS:  Consitutional: As above  ENT: As above  Respiratory: As above    OBJECTIVE:  /84 (BP Location: Left arm, Patient Position: Chair, Cuff Size: Adult Large)  Pulse 98  Temp 98.3  F (36.8  C) (Oral)  Wt 134 lb (60.8 kg)  SpO2 95%  BMI 18.25 kg/m2  GENERAL APPEARANCE: healthy, alert and no distress  EYES: conjunctiva clear  EARS:small cerumen.   Ear canals w/o erythema, TM's intact w/o erythema.    NOSE/MOUTH: nasal edema and erythema  THROAT: mild erythema w/ no tonsillar enlargement . no exudates  NECK: supple, nontender, no lymphadenopathy  RESP: lungs clear to auscultation - no rales, rhonchi or wheezes  CV: regular rates and rhythm, normal S1 S2, no murmur noted  NEURO: awake, alert      ASSESSMENT:     ICD-10-CM    1. Flu-like symptoms R68.89 oseltamivir (TAMIFLU) 75 MG capsule     PLAN:  Lots of rest and fluids.  RTC if any worsening symptoms or if not improving.    Zita Sanford FNP-BC

## 2018-06-27 ENCOUNTER — OFFICE VISIT (OUTPATIENT)
Dept: FAMILY MEDICINE | Facility: CLINIC | Age: 23
End: 2018-06-27
Payer: COMMERCIAL

## 2018-06-27 VITALS
BODY MASS INDEX: 17.84 KG/M2 | TEMPERATURE: 98.5 F | DIASTOLIC BLOOD PRESSURE: 72 MMHG | SYSTOLIC BLOOD PRESSURE: 125 MMHG | OXYGEN SATURATION: 98 % | WEIGHT: 131 LBS | HEART RATE: 77 BPM
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
## 2018-06-27 PROCEDURE — 99213 OFFICE O/P EST LOW 20 MIN: CPT | Performed by: NURSE PRACTITIONER

## 2018-06-27 RX ORDER — DOXYCYCLINE 100 MG/1
100 CAPSULE ORAL 2 TIMES DAILY
Qty: 10 CAPSULE | Refills: 0 | Status: SHIPPED | OUTPATIENT
Start: 2018-06-27 | End: 2019-02-04

## 2018-06-27 NOTE — MR AVS SNAPSHOT
After Visit Summary   6/27/2018    Sivakumar Alonzo    MRN: [id number]           Patient Information     Date Of Birth          1995        Visit Information        Provider Department      6/27/2018 10:40 AM Tonja Short APRN [facility]        Today's Diagnoses     Dog bite, initial encounter    -  1      Care Instructions    Start Doxycylcline 100mg by  mouth twice a day for 5 days.   Ibuprofen 600-800mg every 6 hours. Take scheduled for the next 3-4 days with food.    Ice for 15 minutes 4-6 times per day.   Elevate above heart level when resting.   Lifting restriction for work and at home - no more than 20lbs.   Please come back in to be seen next week to update your workability.     Dog Bite  A dog bite can cause a wound deep enough to break the skin. In such cases, the wound is cleaned and sometimes closed. If the wound is closed, it is usually not completely closed. This is so that fluid can drain if the wound becomes infected. Often, wounds will be left open to heal. In addition to wound care, a tetanus shot may be given, if needed.    Home care    Wash your hands well with soap and warm water before and after caring for the wound. This helps lower the risk of infection.    Care for the wound as directed. If a dressing was applied to the wound, be sure to change it as directed.    If the wound bleeds, place a clean, soft cloth on the wound. Then firmly apply pressure until the bleeding stops. This may take up to 5 minutes. Do not release the pressure and look at the wound during this time.    Most wounds heal within 10 days. But an infection can occur even with proper treatment. So be sure to check the wound daily for signs of infection (see below).    Antibiotics may be prescribed. These help prevent or treat infection. If you re given antibiotics, take them as directed. Also be sure to complete the medicines.  Rabies prevention  Rabies is a virus that can be  carried in certain animals. These can include domestic animals such as dogs and cats. Pets fully vaccinated against rabies (2 shots) are at very low risk of infection. But because human rabies is almost always fatal, any biting pet should be confined for 10 days as an extra precaution. In general, if there is a risk for rabies, the following steps may need to be taken:    If someone s pet dog has bitten you, it should be kept in a secure area for the next 10 days to watch for signs of illness. (If the pet owner won t allow this, contact your local animal control center.) If the dog becomes ill or dies during that time, contact your local animal control center at once so the animal may be tested for rabies. If the dog stays healthy for the next 10 days, there is no danger of rabies in the animal or you.  ? If a stray dog bit you, contact your local animal control center. They can give information on capture, quarantine, and animal rabies testing.  ? If you can t find the animal that bit you in the next 2 days, and if rabies exists in your area, you may need to receive the rabies vaccine series. Call your healthcare provider right away. Or, return to the emergency department promptly.  ? All animal bites should be reported to the local animal control center. If you were not given a form to fill out, you can report this yourself.  Follow-up care  Follow up with your healthcare provider, or as directed.  When to seek medical advice  Call your healthcare provider right away if any of these occur:    Signs of infection:  ? Spreading redness or warmth from the wound  ? Increased pain or swelling  ? Fever of 100.4 F (38 C) or higher, or as directed by your healthcare provider  ? Colored fluid or pus draining from the wound    Signs of rabies infection:  ? Headache  ? Confusion  ? Strange behavior  ? Increased salivating and drooling  ? Seizure    Decreased ability to move any body part near the wound    Bleeding that can't  "be stopped after 5 minutes of firm pressure  Date Last Reviewed: 3/1/2017    4171-7735 [employer]. [address] [place] [address], [place] [address]. All rights reserved. This information is not intended as a substitute for professional medical care. Always follow your healthcare professional's instructions.                Follow-ups after your visit        Who to contact     If you have questions or need follow up information about today's clinic visit or your schedule please contact [facility] directly at [phone].  Normal or non-critical lab and imaging results will be communicated to you by Arius Researchhart, letter or phone within 4 business days after the clinic has received the results. If you do not hear from us within 7 days, please contact the clinic through pickrsett or phone. If you have a critical or abnormal lab result, we will notify you by phone as soon as possible.  Submit refill requests through [employer] or call your pharmacy and they will forward the refill request to us. Please allow 3 business days for your refill to be completed.          Additional Information About Your Visit        HutGrip Information     HutGrip lets you send messages to your doctor, view your test results, renew your prescriptions, schedule appointments and more. To sign up, go to [email or web address] . Click on \"Log in\" on the left side of the screen, which will take you to the Welcome page. Then click on \"Sign up Now\" on the right side of the page.     You will be asked to enter the access code listed below, as well as some personal information. Please follow the directions to create your username and password.     Your access code is: [email or web address]  Expires: 2018 11:13 AM     Your access code will  in 90 days. If you need help or a new code, please call your [facility] or [phone].        Care EveryWhere ID     This is your Care EveryWhere ID. This could be used by other " organizations to access your [place] medical records  [id number]        Your Vitals Were     Pulse Temperature Pulse Oximetry BMI (Body Mass Index)          77 98.5  F (36.9  C) (Oral) 98% 17.84 kg/m2         Blood Pressure from Last 3 Encounters:   06/27/18 125/72   01/28/18 139/84   01/06/18 120/68    Weight from Last 3 Encounters:   06/27/18 131 lb (59.4 kg)   01/28/18 134 lb (60.8 kg)   01/06/18 130 lb (59 kg)              Today, you had the following     No orders found for display         Today's Medication Changes          These changes are accurate as of 6/27/18 11:13 AM.  If you have any questions, ask your nurse or doctor.               Start taking these medicines.        Dose/Directions    doxycycline 100 MG capsule   Commonly known as:  VIBRAMYCIN   Used for:  Dog bite, initial encounter   Started by:  Tonja Short APRN CNP        Dose:  100 mg   Take 1 capsule (100 mg) by mouth 2 times daily for 5 days   Quantity:  10 capsule   Refills:  0            Where to get your medicines      [facility] [address] [facility] [address] [place]  [address] [facility] [address]    Hours:  24-hours Phone:  [phone]     doxycycline 100 MG capsule                Primary Care Provider Office Phone # Fax #    [facility] [phone] [address] [facility] [address]        Equal Access to Services     GIANFRANCO RAMOS AH: Hadii aad ku hadasho Soomaali, waaxda luqadaha, qaybta kaalmada adeegyada, waxay idiin haybhanu marte. So [facility] [phone].    ATENCIÓN: Si habla español, tiene a canela disposición servicios gratuitos de asistencia lingüística. Llame al [phone].    We comply with applicable federal civil rights laws and [place] laws. We do not discriminate on the basis of race, color, national origin, age, disability, sex, sexual orientation, or gender  identity.            Thank you!     Thank you for choosing [facility]  for your care. Our goal is always to provide you with excellent care. Hearing back from our patients is one way we can continue to improve our services. Please take a few minutes to complete the written survey that you may receive in the mail after your visit with us. Thank you!             Your Updated Medication List - Protect others around you: Learn how to safely use, store and throw away your medicines at www.disposemymeds.org.          This list is accurate as of 6/27/18 11:13 AM.  Always use your most recent med list.                   Brand Name Dispense Instructions for use Diagnosis    doxycycline 100 MG capsule    VIBRAMYCIN    10 capsule    Take 1 capsule (100 mg) by mouth 2 times daily for 5 days    Dog bite, initial encounter       fexofenadine 180 MG tablet    ALLEGRA     Take 180 mg by mouth

## 2018-06-27 NOTE — LETTER
June 27, 2018      Sivakumar Alonzo  [address] [facility] [address]        To Whom It May Concern:    Sivakumar Alonzo was seen in our clinic. He may return to work with the following: limited to light duty - lifting no greater than 20 pounds. No repetitive movements of right wrist. He will follow up in 1 week for re-evaluation and update to workability.      Sincerely,        CARLOS ALBERTO Arredondo CNP

## 2018-06-27 NOTE — PATIENT INSTRUCTIONS
Start Doxycylcline 100mg by  mouth twice a day for 5 days.   Ibuprofen 600-800mg every 6 hours. Take scheduled for the next 3-4 days with food.    Ice for 15 minutes 4-6 times per day.   Elevate above heart level when resting.   Lifting restriction for work and at home - no more than 20lbs.   Please come back in to be seen next week to update your workability.     Dog Bite  A dog bite can cause a wound deep enough to break the skin. In such cases, the wound is cleaned and sometimes closed. If the wound is closed, it is usually not completely closed. This is so that fluid can drain if the wound becomes infected. Often, wounds will be left open to heal. In addition to wound care, a tetanus shot may be given, if needed.    Home care    Wash your hands well with soap and warm water before and after caring for the wound. This helps lower the risk of infection.    Care for the wound as directed. If a dressing was applied to the wound, be sure to change it as directed.    If the wound bleeds, place a clean, soft cloth on the wound. Then firmly apply pressure until the bleeding stops. This may take up to 5 minutes. Do not release the pressure and look at the wound during this time.    Most wounds heal within 10 days. But an infection can occur even with proper treatment. So be sure to check the wound daily for signs of infection (see below).    Antibiotics may be prescribed. These help prevent or treat infection. If you re given antibiotics, take them as directed. Also be sure to complete the medicines.  Rabies prevention  Rabies is a virus that can be carried in certain animals. These can include domestic animals such as dogs and cats. Pets fully vaccinated against rabies (2 shots) are at very low risk of infection. But because human rabies is almost always fatal, any biting pet should be confined for 10 days as an extra precaution. In general, if there is a risk for rabies, the following steps may need to be taken:    If  someone s pet dog has bitten you, it should be kept in a secure area for the next 10 days to watch for signs of illness. (If the pet owner won t allow this, contact your local animal control center.) If the dog becomes ill or dies during that time, contact your local animal control center at once so the animal may be tested for rabies. If the dog stays healthy for the next 10 days, there is no danger of rabies in the animal or you.  ? If a stray dog bit you, contact your local animal control center. They can give information on capture, quarantine, and animal rabies testing.  ? If you can t find the animal that bit you in the next 2 days, and if rabies exists in your area, you may need to receive the rabies vaccine series. Call your healthcare provider right away. Or, return to the emergency department promptly.  ? All animal bites should be reported to the local animal control center. If you were not given a form to fill out, you can report this yourself.  Follow-up care  Follow up with your healthcare provider, or as directed.  When to seek medical advice  Call your healthcare provider right away if any of these occur:    Signs of infection:  ? Spreading redness or warmth from the wound  ? Increased pain or swelling  ? Fever of 100.4 F (38 C) or higher, or as directed by your healthcare provider  ? Colored fluid or pus draining from the wound    Signs of rabies infection:  ? Headache  ? Confusion  ? Strange behavior  ? Increased salivating and drooling  ? Seizure    Decreased ability to move any body part near the wound    Bleeding that can't be stopped after 5 minutes of firm pressure  Date Last Reviewed: 3/1/2017    3892-8343 [employer]. [address] [place] [address], [place] [address]. All rights reserved. This information is not intended as a substitute for professional medical care. Always follow your healthcare professional's instructions.

## 2018-06-27 NOTE — PROGRESS NOTES
SUBJECTIVE:   Sivakumar Alonzo is a 23 year old male who presents to clinic today for the following health issues:  Concern - Dog bite  Onset: 6/20/18    Description:   Right hand- feeling stiff, slight pain, pointer finger hurts the most    Intensity: 1-3/10    Progression of Symptoms:  same and constant    Accompanying Signs & Symptoms:  none    Previous history of similar problem:   none    Precipitating factors:   Worsened by: lifting- hurts more, hand gave out earlier this week  Dog did not recognize him and was sick    Alleviating factors:  Improved by: nothing  Therapies Tried and outcome: basic antibiotics, alcohol disinfection    23 year old male being seen for the above concerns. Last week he had to bring his sick dog to the vet when he was bit by him. The dog had to be euthanized for its illness (hip issue). It did not have rabies. Immunizations UTD. It was his family's pet. Since being bit, he has had swelling and a decrease in strength of his right hand. Denies fever. He has used an OTC antibiotic. He works at [employer] in the [employer] department and while lifting a box of approximately 30lbs of tile his right hand gave out and he almost dropped the box. He was told by his manager to come in to be seen.     Problem list and histories reviewed & adjusted, as indicated.  Additional history: as documented    Patient Active Problem List   Diagnosis     Asthma     Mixed anxiety depressive disorder     History reviewed. No pertinent surgical history.    Social History   Substance Use Topics     Smoking status: Never Smoker     Smokeless tobacco: Never Used     Alcohol use No     History reviewed. No pertinent family history.      Current Outpatient Prescriptions   Medication Sig Dispense Refill     doxycycline (VIBRAMYCIN) 100 MG capsule Take 1 capsule (100 mg) by mouth 2 times daily for 5 days 10 capsule 0     fexofenadine (ALLEGRA) 180 MG tablet Take 180 mg by mouth       Allergies   Allergen Reactions     Cefzil  [Cefprozil] Hives     Codeine      Extremely sensitive to this drug - be careful with dose     Sulfa Drugs Swelling     Amoxicillin Rash     Nystatin Rash     BP Readings from Last 3 Encounters:   06/27/18 125/72   01/28/18 139/84   01/06/18 120/68    Wt Readings from Last 3 Encounters:   06/27/18 131 lb (59.4 kg)   01/28/18 134 lb (60.8 kg)   01/06/18 130 lb (59 kg)                  Labs reviewed in EPIC    Reviewed and updated as needed this visit by clinical staff  Tobacco  Allergies  Meds  Problems  Med Hx  Surg Hx  Fam Hx  Soc Hx        Reviewed and updated as needed this visit by Provider  Allergies  Meds  Problems         ROS:  Constitutional, HEENT, cardiovascular, pulmonary, gi and gu systems are negative, except as otherwise noted.    OBJECTIVE:     /72 (BP Location: Right arm, Patient Position: Chair, Cuff Size: Adult Regular)  Pulse 77  Temp 98.5  F (36.9  C) (Oral)  Wt 131 lb (59.4 kg)  SpO2 98%  BMI 17.84 kg/m2  Body mass index is 17.84 kg/(m^2).  GENERAL: healthy, alert and no distress  MS: +1 edema to right hand. Right fingers capillary refill <3seconds, right radial pulse present.   SKIN: several scabs over knuckles of right hand  NEURO: weakness of right hand, sensory exam grossly normal and mentation intact  PSYCH: mentation appears normal, affect normal/bright    Diagnostic Test Results:  none     ASSESSMENT/PLAN:   1. Dog bite, initial encounter  Low suspicion for infection but will start antibiotics prophylactically. Continues to be swollen which may be affecting strength.   Start Doxycycline 100mg by mouth twice a day for 5 days.   Ibuprofen 600-800mg every 6 hours. Take scheduled for the next 3-4 days with food.    Ice for 15 minutes 4-6 times per day.   Elevate above heart level when resting.   Lifting restriction for work and at home - no more than 20lbs.   Please come back in to be seen next week to update your workability.   - doxycycline (VIBRAMYCIN) 100 MG capsule;  Take 1 capsule (100 mg) by mouth 2 times daily for 5 days  Dispense: 10 capsule; Refill: 0  Note written for work    See Patient Instructions    Patient verbalizes understanding and agrees with plan of care. Patient stable for discharge.  The benefits, risks and potential side effects were discussed in detail.   Black box warnings discussed as relevant. All patient questions were answered. The patient was instructed to follow up immediately if any adverse reactions develop.     Madie Guaman RN DNP Student.     I, Tonja Short, was present with the nurse practitioner student who participated in the service and in the documentation of the note.  I have verified the history and personally performed the physical exam and medical decision making.  I agree with the assessment and plan of care as documented in the note.      Items personally reviewed: vitals.        CARLOS ALBERTO [facility]

## 2018-06-28 ASSESSMENT — ASTHMA QUESTIONNAIRES: ACT_TOTALSCORE: 25

## 2018-07-07 ENCOUNTER — OFFICE VISIT (OUTPATIENT)
Dept: URGENT CARE | Facility: URGENT CARE | Age: 23
End: 2018-07-07
Payer: COMMERCIAL

## 2018-07-07 VITALS
DIASTOLIC BLOOD PRESSURE: 66 MMHG | TEMPERATURE: 98.6 F | SYSTOLIC BLOOD PRESSURE: 113 MMHG | HEART RATE: 75 BPM | OXYGEN SATURATION: 98 % | BODY MASS INDEX: 18.33 KG/M2 | WEIGHT: 134.6 LBS
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
## 2018-07-07 PROCEDURE — 99213 OFFICE O/P EST LOW 20 MIN: CPT | Performed by: FAMILY MEDICINE

## 2018-07-07 NOTE — MR AVS SNAPSHOT
After Visit Summary   7/7/2018    Sivakumar Alonzo    MRN: [id number]           Patient Information     Date Of Birth          1995        Visit Information        Provider Department      7/7/2018 3:20 PM Sophia Call MD [facility]        Care Instructions      Continue work on range of motion/keep fingers moving    Cleared to return to work today with no restrictions      Hand and Wrist Exercises: Finger  and Release      This exercise is designed to stretch and strengthen your hands and wrists. Before beginning, read through all the instructions. While exercising, breathe normally. If you feel any pain, stop the exercise. If pain persists, inform your healthcare provider.    With your _____ hand, make a tight fist. (Or you can grasp a sponge or ball.) Hold for _____ seconds. Then relax.    Spread your fingers as far apart as possible. Hold for _____ seconds. Then relax.    Repeat _____ times. Do _____ sets a day.  Date Last Reviewed: 9/9/2015 2000-2017 [employer]. [address] [place] [address]. All rights reserved. This information is not intended as a substitute for professional medical care. Always follow your healthcare professional's instructions.                Follow-ups after your visit        Who to contact     If you have questions or need follow up information about today's clinic visit or your schedule please contact [facility] directly at [phone].  Normal or non-critical lab and imaging results will be communicated to you by MyChart, letter or phone within 4 business days after the clinic has received the results. If you do not hear from us within 7 days, please contact the clinic through MyChart or phone. If you have a critical or abnormal lab result, we will notify you by phone as soon as possible.  Submit refill requests through [employer] or call your pharmacy and they will forward the refill request to us. Please  "allow 3 business days for your refill to be completed.          Additional Information About Your Visit        MyChart Information     [employer] lets you send messages to your doctor, view your test results, renew your prescriptions, schedule appointments and more. To sign up, go to [email or web address] . Click on \"Log in\" on the left side of the screen, which will take you to the Welcome page. Then click on \"Sign up Now\" on the right side of the page.     You will be asked to enter the access code listed below, as well as some personal information. Please follow the directions to create your username and password.     Your access code is: [email or web address]  Expires: 2018 11:13 AM     Your access code will  in 90 days. If you need help or a new code, please call your [facility] clinic or [phone].        Care EveryWhere ID     This is your Care EveryWhere ID. This could be used by other organizations to access your [facility] medical records  [id number]        Your Vitals Were     Pulse Temperature Pulse Oximetry BMI (Body Mass Index)          75 98.6  F (37  C) (Tympanic) 98% 18.33 kg/m2         Blood Pressure from Last 3 Encounters:   18 113/66   18 125/72   18 139/84    Weight from Last 3 Encounters:   18 134 lb 9.6 oz (61.1 kg)   18 131 lb (59.4 kg)   18 134 lb (60.8 kg)              Today, you had the following     No orders found for display       Primary Care Provider Office Phone # Fax #    [facility] [phone]       [address] [facility] [address]        Equal Access to Services     [facility] RACHEL : Jacqueline Prescott, zahida bridges, azeb kaalmada omar, janey marte. So [facility] [phone].    ATENCIÓN: Si habla español, tiene a canela disposición servicios gratuitos de asistencia lingüística. Llame al [phone].    We comply with applicable federal civil rights laws and [place] " laws. We do not discriminate on the basis of race, color, national origin, age, disability, sex, sexual orientation, or gender identity.            Thank you!     Thank you for choosing [facility]  [facility]. Our goal is always to provide you with excellent care. Hearing back from our patients is one way we can continue to improve our services. Please take a few minutes to complete the written survey that you may receive in the mail after your visit with us. Thank you!             Your Updated Medication List - Protect others around you: Learn how to safely use, store and throw away your medicines at www.disposemymeds.org.          This list is accurate as of 7/7/18  4:29 PM.  Always use your most recent med list.                   Brand Name Dispense Instructions for use Diagnosis    fexofenadine 180 MG tablet    ALLEGRA     Take 180 mg by mouth

## 2018-07-07 NOTE — PROGRESS NOTES
SUBJECTIVE:   Sivakumar Alonzo is a 23 year old male who presents to clinic today for the following health issues:       r hand injury due dog bite      Duration: date of injury 6/20/18    Description  Location:  Multiple bites on right hand, patient was taking his dog to the vet to be euthanized.  Seen on 6/17/18 and treated with doxycycline and on restrictions at work.  Now pain has resolved, no redness/drainage from wound since 6/27/18 appointment, mild tender 3rd digit -3 bites to area    Intensity:  mild    Accompanying signs and symptoms:NO numbness, tingling, weakness/decrease  strength,  No warmth, swelling or redness    History  Previous similar problem: nNO  Previous evaluation:  Yes as noted on 6/27/18    Precipitating or alleviating factors:  Trauma or overuse: YES- as noted  Aggravating factors include: lifting and overuse now resolved    Therapies tried and outcome: rest/inactivity, ice, Ibuprofen and abx        Problem list and histories reviewed & adjusted, as indicated.  Additional history: as documented    Patient Active Problem List   Diagnosis     Asthma     Mixed anxiety depressive disorder     No past surgical history on file.    Social History   Substance Use Topics     Smoking status: Never Smoker     Smokeless tobacco: Never Used     Alcohol use No     No family history on file.      Current Outpatient Prescriptions   Medication Sig Dispense Refill     fexofenadine (ALLEGRA) 180 MG tablet Take 180 mg by mouth       Allergies   Allergen Reactions     Cefzil [Cefprozil] Hives     Codeine      Extremely sensitive to this drug - be careful with dose     Sulfa Drugs Swelling     Amoxicillin Rash     Nystatin Rash     No lab results found.   BP Readings from Last 3 Encounters:   07/07/18 113/66   06/27/18 125/72   01/28/18 139/84    Wt Readings from Last 3 Encounters:   07/07/18 134 lb 9.6 oz (61.1 kg)   06/27/18 131 lb (59.4 kg)   01/28/18 134 lb (60.8 kg)                  Labs reviewed in  EPIC    Reviewed and updated as needed this visit by clinical staff  Allergies  Meds       Reviewed and updated as needed this visit by Provider         ROS:  Constitutional, HEENT, cardiovascular, pulmonary, gi and gu systems are negative, except as otherwise noted.    OBJECTIVE:     /66  Pulse 75  Temp 98.6  F (37  C) (Tympanic)  Wt 134 lb 9.6 oz (61.1 kg)  SpO2 98%  BMI 18.33 kg/m2  Body mass index is 18.33 kg/(m^2).   GENERAL: healthy, alert and no distress  MS: no gross musculoskeletal defects noted, no edema  MS:  hand exam  Normal hand appearance and range of motion, non-tender, no swelling  3rd digit wounds healed on palmar surface of DIP, dorsum of finger between pip and mcp, and small area at base of third finger soft tissue palmar surface radial side no swelling or redness, mild pain with rom of finge in areas of previous injury, normal flexion and extension of dip/pip/cmp  NEURO: Normal strength and tone normal  strength, 5/5 equal bilateral, mentation intact and speech normal    Diagnostic Test Results:  none     ASSESSMENT/PLAN:     Problem List Items Addressed This Visit     None      Visit Diagnoses     Dog bite, subsequent encounter    -  Primary         ASSESSMENT/PLAN:      ICD-10-CM    1. Dog bite, subsequent encounter W54.0XXD        Patient Instructions       Continue work on range of motion/keep fingers moving    Cleared to return to work today with no restrictions      Hand and Wrist Exercises: Finger  and Release      This exercise is designed to stretch and strengthen your hands and wrists. Before beginning, read through all the instructions. While exercising, breathe normally. If you feel any pain, stop the exercise. If pain persists, inform your healthcare provider.    With your _____ hand, make a tight fist. (Or you can grasp a sponge or ball.) Hold for _____ seconds. Then relax.    Spread your fingers as far apart as possible. Hold for _____ seconds. Then  relax.    Repeat _____ times. Do _____ sets a day.  Date Last Reviewed: 9/9/2015 2000-2017 [employer] [facility]. [address] [place] [address], [place] [address]. All rights reserved. This information is not intended as a substitute for professional medical care. Always follow your healthcare professional's instructions.                Sophia Call MD  [facility]

## 2018-07-07 NOTE — PATIENT INSTRUCTIONS
Continue work on range of motion/keep fingers moving    Cleared to return to work today with no restrictions      Hand and Wrist Exercises: Finger  and Release      This exercise is designed to stretch and strengthen your hands and wrists. Before beginning, read through all the instructions. While exercising, breathe normally. If you feel any pain, stop the exercise. If pain persists, inform your healthcare provider.    With your _____ hand, make a tight fist. (Or you can grasp a sponge or ball.) Hold for _____ seconds. Then relax.    Spread your fingers as far apart as possible. Hold for _____ seconds. Then relax.    Repeat _____ times. Do _____ sets a day.  Date Last Reviewed: 9/9/2015 2000-2017 [employer]. [address] [place] [address], [place] [address]. All rights reserved. This information is not intended as a substitute for professional medical care. Always follow your healthcare professional's instructions.

## 2018-07-07 NOTE — LETTER
[facility]  [address] [facility] [address]  Phone: [phone]      July 7, 2018      RE: Sivakumar Alonzo  [address] [facility] [address]        To whom it may concern:    Sivakumar Alonzo is under my professional care. The employee is ABLE to return to work today with no restrictions.         Sincerely,        Sophia Call MD/

## 2018-09-06 ENCOUNTER — OFFICE VISIT (OUTPATIENT)
Dept: FAMILY MEDICINE | Facility: CLINIC | Age: 23
End: 2018-09-06
Payer: COMMERCIAL

## 2018-09-06 VITALS
HEIGHT: 72 IN | OXYGEN SATURATION: 98 % | TEMPERATURE: 97.7 F | DIASTOLIC BLOOD PRESSURE: 78 MMHG | SYSTOLIC BLOOD PRESSURE: 118 MMHG | HEART RATE: 84 BPM | WEIGHT: 128.6 LBS | BODY MASS INDEX: 17.42 KG/M2 | RESPIRATION RATE: 16 BRPM

## 2018-09-06 DIAGNOSIS — R07.0 THROAT PAIN: Primary | ICD-10-CM

## 2018-09-06 DIAGNOSIS — J06.9 VIRAL UPPER RESPIRATORY TRACT INFECTION: ICD-10-CM

## 2018-09-06 LAB
DEPRECATED S PYO AG THROAT QL EIA: NORMAL
SPECIMEN SOURCE: NORMAL

## 2018-09-06 PROCEDURE — 87081 CULTURE SCREEN ONLY: CPT | Performed by: PHYSICIAN ASSISTANT

## 2018-09-06 PROCEDURE — 99213 OFFICE O/P EST LOW 20 MIN: CPT | Performed by: PHYSICIAN ASSISTANT

## 2018-09-06 PROCEDURE — 87880 STREP A ASSAY W/OPTIC: CPT | Performed by: PHYSICIAN ASSISTANT

## 2018-09-06 NOTE — LETTER
[facility]  [address] [facility]  [place] [address]  Phone: [phone]    September 6, 2018        Sivakumar Alonzo  [address] [facility] [address]          To whom it may concern:    RE: Sivakumar Alonzo    Patient was seen and treated today at our clinic and missed work yesterday and today due to the symptoms discussed today.    Please contact me for questions or concerns.      Sincerely,          Tessie Marsh PA-C

## 2018-09-06 NOTE — PROGRESS NOTES
"  SUBJECTIVE:  Sivakumar Alonzo is a 23 year old male who presents with the following concerns;              Symptoms: cc Present Absent Comment   Fever/Chills  x     Fatigue  x     Muscle Aches   x    Eye Irritation   x    Sneezing  x     Nasal Mark/Drg  x     Sinus Pressure/Pain  x     Loss of smell  x     Dental pain   x    Sore Throat  x     Swollen Glands  x     Ear Pain/Fullness  x     Cough  x     Wheeze   x    Chest Pain   x    Shortness of breath   x    Rash   x    Other   x      Symptom duration:  x3days   Sympom severity:  worsening   Treatments tried:  OTC cough meds, Advil    Contacts:  none       Medications updated and reviewed.  Past, family and surgical history is updated and reviewed in the record.  ROS:  Other than noted above, general, HEENT, respiratory, cardiac and gastrointestinal systems are negative.    OBJECTIVE:  /78  Pulse 84  Temp 97.7  F (36.5  C) (Tympanic)  Resp 16  Ht 6' 0.17\" (1.833 m)  Wt 128 lb 9.6 oz (58.3 kg)  SpO2 98%  BMI 17.36 kg/m2  GENERAL: Pleasant and interactive.  Alert and oriented x 3.  No acute distress.   HEENT: Diffuse pharyngeal erythema. Tonsils not visible.  Sclera, lids and conjunctiva are normal.  Nose and ears clear.  NECK: Mild adenopathy.  CHEST:  clear, no wheezing or rales. Normal symmetric air entry throughout both lung fields. No chest wall deformities or tenderness.  HEART:  S1 and S2 normal, no murmurs, clicks, gallops or rubs. Regular rate and rhythm.  SKIN:  Only benign skin findings. No unusual rashes or suspicious skin lesions noted. Nails appear normal.    RST - negative.  24hr culture pending.        Assessment:    Encounter Diagnoses   Name Primary?     Throat pain Yes     Viral upper respiratory tract infection      Plan:   Orders Placed This Encounter     Asthma Action Plan (AAP)         Supportive therapy also discussed. Follow up if symptoms fail to improve or worsen.      The patient was in agreement with the plan today and had no " questions or concerns prior to leaving the clinic.     Tessie Marsh PA-C

## 2018-09-06 NOTE — LETTER
My Asthma Action Plan  Name: Sivakumar Alonzo   YOB: 1995  Date: 9/6/2018   My doctor: Tessie Marsh PA-C   My clinic: [facility]        My Control Medicine: None  My Rescue Medicine: None   My Asthma Severity: Asthma  Avoid your asthma triggers: .               GREEN ZONE   Good Control    I feel good    No cough or wheeze    Can work, sleep and play without asthma symptoms       Take your asthma control medicine every day.     1. If exercise triggers your asthma, take your rescue medication    15 minutes before exercise or sports, and    During exercise if you have asthma symptoms  2. Spacer to use with inhaler: If you have a spacer, make sure to use it with your inhaler             YELLOW ZONE Getting Worse  I have ANY of these:    I do not feel good    Cough or wheeze    Chest feels tight    Wake up at night   1. Keep taking your Green Zone medications  2. Start taking your rescue medicine:    every 20 minutes for up to 1 hour. Then every 4 hours for 24-48 hours.  3. If you stay in the Yellow Zone for more than 12-24 hours, contact your doctor.  4. If you do not return to the Green Zone in 12-24 hours or you get worse, start taking your oral steroid medicine if prescribed by your provider.           RED ZONE Medical Alert - Get Help  I have ANY of these:    I feel awful    Medicine is not helping    Breathing getting harder    Trouble walking or talking    Nose opens wide to breathe       1. Take your rescue medicine NOW  2. If your provider has prescribed an oral steroid medicine, start taking it NOW  3. Call your doctor NOW  4. If you are still in the Red Zone after 20 minutes and you have not reached your doctor:    Take your rescue medicine again and    Call 911 or go to the emergency room right away    See your regular doctor within 2 weeks of an Emergency Room or Urgent Care visit for follow-up treatment.          Annual Reminders:  Meet with Asthma Educator,  Flu Shot in  the Fall, consider Pneumonia Vaccination for patients with asthma (aged 19 and older).    Pharmacy: [id number]  [place], [place] - [address] [facility] AT [facility]                      Asthma Triggers  How To Control Things That Make Your Asthma Worse    Triggers are things that make your asthma worse.  Look at the list below to help you find your triggers and what you can do about them.  You can help prevent asthma flare-ups by staying away from your triggers.      Trigger                                                          What you can do   Cigarette Smoke  Tobacco smoke can make asthma worse. Do not allow smoking in your home, car or around you.  Be sure no one smokes at a child s day care or school.  If you smoke, ask your health care provider for ways to help you quit.  Ask family members to quit too.  Ask your health care provider for a referral to Quit Plan to help you quit smoking, or call 4-802-334-PLAN.     Colds, Flu, Bronchitis  These are common triggers of asthma. Wash your hands often.  Don t touch your eyes, nose or mouth.  Get a flu shot every year.     Dust Mites  These are tiny bugs that live in cloth or carpet. They are too small to see. Wash sheets and blankets in hot water every week.   Encase pillows and mattress in dust mite proof covers.  Avoid having carpet if you can. If you have carpet, vacuum weekly.   Use a dust mask and HEPA vacuum.   Pollen and Outdoor Mold  Some people are allergic to trees, grass, or weed pollen, or molds. Try to keep your windows closed.  Limit time out doors when pollen count is high.   Ask you health care provider about taking medicine during allergy season.     Animal Dander  Some people are allergic to skin flakes, urine or saliva from pets with fur or feathers. Keep pets with fur or feathers out of your home.    If you can t keep the pet outdoors, then keep the pet out of your bedroom.  Keep the bedroom door  closed.  Keep pets off cloth furniture and away from stuffed toys.     Mice, Rats, and Cockroaches  Some people are allergic to the waste from these pests.   Cover food and garbage.  Clean up spills and food crumbs.  Store grease in the refrigerator.   Keep food out of the bedroom.   Indoor Mold  This can be a trigger if your home has high moisture. Fix leaking faucets, pipes, or other sources of water.   Clean moldy surfaces.  Dehumidify basement if it is damp and smelly.   Smoke, Strong Odors, and Sprays  These can reduce air quality. Stay away from strong odors and sprays, such as perfume, powder, hair spray, paints, smoke incense, paint, cleaning products, candles and new carpet.   Exercise or Sports  Some people with asthma have this trigger. Be active!  Ask your doctor about taking medicine before sports or exercise to prevent symptoms.    Warm up for 5-10 minutes before and after sports or exercise.     Other Triggers of Asthma  Cold air:  Cover your nose and mouth with a scarf.  Sometimes laughing or crying can be a trigger.  Some medicines and food can trigger asthma.

## 2018-09-06 NOTE — MR AVS SNAPSHOT
"              After Visit Summary   9/6/2018    Sivakumar Alonzo    MRN: [id number]           Patient Information     Date Of Birth          1995        Visit Information        Provider Department      9/6/2018 9:40 AM Tessie Marsh PA-C [facility]        Today's Diagnoses     Throat pain    -  1    Viral upper respiratory tract infection          Care Instructions    Your strep test is negative. Your symptoms are likely caused by a viral syndrome.  Increase your water intake in order to keep the secretions/mucous in your upper respiratory tract thin. Get plenty of rest and wash your hands well. Follow up if symptoms fail to improve or worsen.             Follow-ups after your visit        Follow-up notes from your care team     Return in about 2 weeks (around 9/20/2018), or if symptoms worsen or fail to improve.      Who to contact     Normal or non-critical lab and imaging results will be communicated to you by [employer], letter or phone within 4 business days after the clinic has received the results. If you do not hear from us within 7 days, please contact the clinic through [employer] or phone. If you have a critical or abnormal lab result, we will notify you by phone as soon as possible.  Submit refill requests through [employer] or call your pharmacy and they will forward the refill request to us. Please allow 3 business days for your refill to be completed.          If you need to speak with a  for additional information , please call: [phone]             Additional Information About Your Visit        [employer] Information     [employer] lets you send messages to your doctor, view your test results, renew your prescriptions, schedule appointments and more. To sign up, go to [email or web address] . Click on \"Log in\" on the left side of the screen, which will take you to the Welcome page. Then click on \"Sign up Now\" on the right side of the page.     You will be asked to " "enter the access code listed below, as well as some personal information. Please follow the directions to create your username and password.     Your access code is: [email or web address]  Expires: 2018 11:13 AM     Your access code will  in 90 days. If you need help or a new code, please call your [facility] clinic or [phone].        Care EveryWhere ID     This is your Care EveryWhere ID. This could be used by other organizations to access your [facility] medical records  [id number]        Your Vitals Were     Pulse Temperature Respirations Height Pulse Oximetry BMI (Body Mass Index)    84 97.7  F (36.5  C) (Tympanic) 16 6' 0.17\" (1.833 m) 98% 17.36 kg/m2       Blood Pressure from Last 3 Encounters:   18 118/78   18 113/66   18 125/72    Weight from Last 3 Encounters:   18 128 lb 9.6 oz (58.3 kg)   18 134 lb 9.6 oz (61.1 kg)   18 131 lb (59.4 kg)              We Performed the Following     Asthma Action Plan (AAP)     Rapid strep screen        Primary Care Provider Office Phone # Fax #    [facility] [phone] [address] [facility] [address]        Equal Access to Services     SAMUEL RAMOS : Hadii loyda ku hadasho Soomaali, waaxda luqadaha, qaybta kaalmada omar, janey spears . So [facility] [phone].    ATENCIÓN: Si habla español, tiene a canela disposición servicios gratuitos de asistencia lingüística. Sagar al [phone].    We comply with applicable federal civil rights laws and [place] laws. We do not discriminate on the basis of race, color, national origin, age, disability, sex, sexual orientation, or gender identity.            Thank you!     Thank you for choosing [facility]  for your care. Our goal is always to provide you with excellent care. Hearing back from our patients is one way we can continue to improve our services. Please take a few minutes to complete the written survey " that you may receive in the mail after your visit with us. Thank you!             Your Updated Medication List - Protect others around you: Learn how to safely use, store and throw away your medicines at www.disposemymeds.org.          This list is accurate as of 9/6/18 10:10 AM.  Always use your most recent med list.                   Brand Name Dispense Instructions for use Diagnosis    fexofenadine 180 MG tablet    ALLEGRA     Take 180 mg by mouth

## 2018-09-07 LAB
BACTERIA SPEC CULT: NORMAL
SPECIMEN SOURCE: NORMAL

## 2018-10-12 ENCOUNTER — OFFICE VISIT (OUTPATIENT)
Dept: INTERNAL MEDICINE | Facility: CLINIC | Age: 23
End: 2018-10-12
Payer: COMMERCIAL

## 2018-10-12 VITALS
SYSTOLIC BLOOD PRESSURE: 125 MMHG | WEIGHT: 132 LBS | BODY MASS INDEX: 17.82 KG/M2 | RESPIRATION RATE: 16 BRPM | TEMPERATURE: 97 F | DIASTOLIC BLOOD PRESSURE: 72 MMHG | HEART RATE: 72 BPM

## 2018-10-12 DIAGNOSIS — F60.3 BORDERLINE PERSONALITY DISORDER (H): ICD-10-CM

## 2018-10-12 DIAGNOSIS — R25.3 EYELID TWITCH: Primary | ICD-10-CM

## 2018-10-12 DIAGNOSIS — F41.8 MIXED ANXIETY DEPRESSIVE DISORDER: ICD-10-CM

## 2018-10-12 PROCEDURE — 99214 OFFICE O/P EST MOD 30 MIN: CPT | Performed by: INTERNAL MEDICINE

## 2018-10-12 NOTE — PROGRESS NOTES
SUBJECTIVE:   Sivakumar Alonzo is a 23 year old male who presents to clinic today for the following health issues:      Concern - eye lid twitching  Onset: 2 years    Description:   Right eyelid twitches was occasionally now is happening 3-4 times daily lasting 2 minutes at a time    Intensity: moderate, severe    Progression of Symptoms:  worsening and intermittent    Accompanying Signs & Symptoms:  none    Previous history of similar problem:   Seems to happen when talking to people    Precipitating factors:   Worsened by: none known    Alleviating factors:  Improved by: none known    Mt Dew, Coke, and other fluids  No energy drinks, tea, coffee, alcohol, or energy drinks  Denies smoking   Sleeps 6-9 hrs/night, about 8h/night, wakes feeling refreshed    Enjoys driving, listening to heavy metal music, works at [employer] in [place] de[place] 42-45h/wk.  Occasionally jogs.      Psychosocial stressors: people -- agoraphobia.  Bad around holidays.    History depression / anxiety, denies over the past 2-3 years.  5-6 years ago was admitted for depression.  Denies current suicidal ideation, intent, or plan.    s/p LASIK 2 years ago    Therapies Tried and outcome: no      1. Eyelid twitch        PMH: Updated and/or reviewed in chart.    PSH: Updated and/or reviewed in chart.    Family History: Updated and/or reviewed in chart.     ROS:  Constitutional, neuro, EMT, endocrine, pulmonary, cardiac, gastrointestinal, genitourinary, musculoskeletal, integument and psychiatric systems are otherwise negative.    OBJECTIVE:                                                    /72  Pulse 72  Temp 97  F (36.1  C) (Tympanic)  Resp 16  Wt 132 lb (59.9 kg)  BMI 17.82 kg/m2    GEN: No acute distress  EYES: No conjunctival injection or icterus, EOMI grossly intact, PERRL, no visible tremor or blepharospasm  NEURO: Normal gait, MAEx4, light touch sensation grossly intact  PSYCH: Normal to subdued mood and affect     ASSESSMENT/PLAN:                                                     1. Eyelid twitch  Reassurance provided, some counseling on sleep, stress, mood, caffeine.  mointor 2 months, consider referral to neurology if continuing.    2. History of diagnosis borderline personality disorder (H)  3. Mixed anxiety depressive disorder  Well controlled at present off medications       I spent a total of 25 minutes with the patient of which greater than 50% were devoted to counseling and coordination of care: eyelid twitch, history depression.        Vishal Blair MD

## 2018-10-12 NOTE — MR AVS SNAPSHOT
"              After Visit Summary   10/12/2018    Sivakumar Alonzo    MRN: [id number]           Patient Information     Date Of Birth          1995        Visit Information        Provider Department      10/12/2018 10:00 AM Vishal Blair MD [facility]        Today's Diagnoses     Eyelid twitch    -  1    History of diagnosis borderline personality disorder (H)        Mixed anxiety depressive disorder           Follow-ups after your visit        Follow-up notes from your care team     Return in about 2 months (around 2018), or if symptoms worsen or fail to improve.      Who to contact     Normal or non-critical lab and imaging results will be communicated to you by [employer], letter or phone within 4 business days after the clinic has received the results. If you do not hear from us within 7 days, please contact the clinic through [employer] or phone. If you have a critical or abnormal lab result, we will notify you by phone as soon as possible.  Submit refill requests through [employer] or call your pharmacy and they will forward the refill request to us. Please allow 3 business days for your refill to be completed.          If you need to speak with a  for additional information , please call: [phone]             Additional Information About Your Visit        [employer] Information     [employer] lets you send messages to your doctor, view your test results, renew your prescriptions, schedule appointments and more. To sign up, go to [email or web address] . Click on \"Log in\" on the left side of the screen, which will take you to the Welcome page. Then click on \"Sign up Now\" on the right side of the page.     You will be asked to enter the access code listed below, as well as some personal information. Please follow the directions to create your username and password.     Your access code is: [email or web address]  Expires: 1/10/2019 10:35 AM     Your access code will  in 90 days. If you " need help or a new code, please call your [facility] or [phone].        Care EveryWhere ID     This is your Care EveryWhere ID. This could be used by other organizations to access your [facility] medical records  [id number]        Your Vitals Were     Pulse Temperature Respirations BMI (Body Mass Index)          72 97  F (36.1  C) (Tympanic) 16 17.82 kg/m2         Blood Pressure from Last 3 Encounters:   10/12/18 125/72   09/06/18 118/78   07/07/18 113/66    Weight from Last 3 Encounters:   10/12/18 132 lb (59.9 kg)   09/06/18 128 lb 9.6 oz (58.3 kg)   07/07/18 134 lb 9.6 oz (61.1 kg)              Today, you had the following     No orders found for display       Primary Care Provider Office Phone # Fax #    [facility] [phone]       [address] [facility] [address]        Equal Access to Services     SAMUEL RAMOS : Hadii aad ku hadasho Soomaali, waaxda luqadaha, qaybta kaalmada adeegyada, waxay idiin hayaan gabrieleg mary bethararosendo lachristopher . So [facility] [phone].    ATENCIÓN: Si habla español, tiene a canela disposición servicios gratuitos de asistencia lingüística. Llame al [phone].    We comply with applicable federal civil rights laws and [place] laws. We do not discriminate on the basis of race, color, national origin, age, disability, sex, sexual orientation, or gender identity.            Thank you!     Thank you for choosing [facility]  for your care. Our goal is always to provide you with excellent care. Hearing back from our patients is one way we can continue to improve our services. Please take a few minutes to complete the written survey that you may receive in the mail after your visit with us. Thank you!             Your Updated Medication List - Protect others around you: Learn how to safely use, store and throw away your medicines at www.disposemymeds.org.          This list is accurate as of 10/12/18 10:35 AM.  Always use your most recent med  list.                   Brand Name Dispense Instructions for use Diagnosis    fexofenadine 180 MG tablet    ALLEGRA     Take 180 mg by mouth

## 2018-12-21 ENCOUNTER — OFFICE VISIT (OUTPATIENT)
Dept: URGENT CARE | Facility: URGENT CARE | Age: 23
End: 2018-12-21
Payer: COMMERCIAL

## 2018-12-21 VITALS
SYSTOLIC BLOOD PRESSURE: 112 MMHG | WEIGHT: 133.6 LBS | BODY MASS INDEX: 18.04 KG/M2 | DIASTOLIC BLOOD PRESSURE: 73 MMHG | HEART RATE: 71 BPM | OXYGEN SATURATION: 98 % | TEMPERATURE: 98.5 F

## 2018-12-21 DIAGNOSIS — A08.4 VIRAL GASTROENTERITIS: Primary | ICD-10-CM

## 2018-12-21 PROCEDURE — 99213 OFFICE O/P EST LOW 20 MIN: CPT | Performed by: PHYSICIAN ASSISTANT

## 2018-12-21 NOTE — PROGRESS NOTES
S: 22 yo male here for abdominal pain, nausea and diarrhea that started at 10 AM this morning.  No fever.  No cough or sore throat.  No rash.  Needs a note for work today.      Allergies   Allergen Reactions     Cefzil [Cefprozil] Hives     Coconut Oil      Codeine      Extremely sensitive to this drug - be careful with dose     Sulfa Drugs Swelling     Vancomycin      Amoxicillin Rash     Nystatin Rash       No past medical history on file.  No history of asthma.      Current Outpatient Medications on File Prior to Visit:  fexofenadine (ALLEGRA) 180 MG tablet Take 180 mg by mouth     No current facility-administered medications on file prior to visit.     Social History     Tobacco Use     Smoking status: Never Smoker     Smokeless tobacco: Never Used   Substance Use Topics     Alcohol use: No       ROS:  CONSTITUTIONAL: Negative for fatigue or fever.  EYES: Negative for eye problems.  ENT: As above.  RESP: As above.  CV: Negative for chest pains.  GI: Negative for vomiting.  MUSCULOSKELETAL:  Negative for significant muscle or joint pains.  NEUROLOGIC: Negative for headaches.  SKIN: Negative for rash.    OBJECTIVE:  /73 (BP Location: Left arm, Patient Position: Chair, Cuff Size: Adult Regular)   Pulse 71   Temp 98.5  F (36.9  C) (Oral)   Wt 60.6 kg (133 lb 9.6 oz)   SpO2 98%   BMI 18.04 kg/m    GENERAL APPEARANCE: Healthy, alert and no distress.  EYES:Conjunctiva/sclera clear.  EARS: No cerumen.   Ear canals w/o erythema.  TM's intact w/o erythema.    NOSE/MOUTH: Nose without ulcers, erythema or lesions.  SINUSES: No maxillary sinus tenderness.  THROAT: No erythema w/o tonsillar enlargement . No exudates.  NECK: Supple, nontender, no lymphadenopathy.  RESP: Lungs clear to auscultation - no rales, rhonchi or wheezes  CV: Regular rate and rhythm, normal S1 S2, no murmur noted.  NEURO: Awake, alert    SKIN: No rashes  Abdomen-mild diffuse tenderness to palpation throughout the abdomen.  Normal active bowel  sounds.  No rebound or guarding.  No hepatosplenomegaly.        ASSESSMENT:     ICD-10-CM    1. Viral gastroenteritis A08.4        PLAN: Off work today.  Given information pamphlet on viral gastroenteritis.  Lots of rest and fluids.  RTC if any worsening symptoms or if not improving.    Debra Simpson PA-C

## 2018-12-21 NOTE — LETTER
[facility]  [address]  [facility] [address]  Phone: [phone]    December 21, 2018        Sivakumar Alonzo  [address] [facility] [address]          To whom it may concern:    RE: Sivakumar Alonzo    Patient was seen and treated today at our clinic. Please excuse from work today due to illness.    Please contact me for questions or concerns.      Sincerely,        Debra Simpson PA-C

## 2018-12-21 NOTE — PATIENT INSTRUCTIONS
"  Patient Education     Viral Gastroenteritis (Adult)    Gastroenteritis is commonly called the \"stomach flu,\" although it has nothing to do with influenza. It is most often caused by a virus that affects the stomach and intestinal tract and usually lasts from 2 to 7 days. Common viruses causing gastroenteritis include norovirus, rotavirus, and hepatitis A. Non-viral causes of gastroenteritis include bacteria, parasites, and toxins.  The danger from repeated vomiting or diarrhea is dehydration. This is the loss of too much fluid from the body. When this occurs, body fluids must be replaced. Antibiotics don't help with this illness because it is usually viral. Simple home treatment will be helpful.  Symptoms of viral gastroenteritis may include:    Watery, loose stools    Stomach pain or abdominal cramps    Fever and chills    Nausea and vomiting    Loss of bowel control    Headache  Home care  Gastroenteritis is transmitted by contact with the stool or vomit of an infected person. This can occur from person to person or from contact with a contaminated surface.  Follow these guidelines when caring for yourself at home:    If symptoms are severe, rest at home for the next 24 hours or until you are feeling better.    Wash your hands with soap and water or use alcohol-based  to prevent the spread of infection. Wash your hands after touching anyone who is sick.    Wash your hands or use alcohol-based  after using the toilet and before meals. Clean the toilet after each use.  Remember these tips when preparing food:    People with diarrhea should not prepare or serve food to others. When preparing foods, wash your hands before and after.    Wash your hands after using cutting boards, countertops, knives, or utensils that have been in contact with raw food.    Dry your hands with a single use towel.    Keep uncooked meats away from cooked and ready-to-eat foods.  Medicine  You may use acetaminophen or " NSAID medicines like ibuprofen or naproxen to control fever unless another medicine was given. If you have chronic liver or kidney disease, talk with your healthcare provider before using these medicines. Also talk with your provider if you've had a stomach ulcer or gastrointestinal bleeding. Don't give aspirin to anyone under 18 years of age who is ill with a fever. It may cause severe liver damage. Don't use NSAIDS is you are already taking one for another condition (like arthritis) or are on aspirin (such as for heart disease or after a stroke).  If medicine for vomiting or diarrhea are prescribed, take these only as directed. Nausea and diarrhea medicines are generally OK unless you have bleeding, fever, or severe abdominal pain.  Diet  Follow these guidelines for food:    Water and liquids are important so you don't get dehydrated. Drink a small amount at a time or suck on ice chips if you are vomiting.    If you eat, avoid fatty, greasy, spicy, or fried foods.    Don't eat dairy if you have diarrhea. This can make diarrhea worse.    Avoid tobacco, alcohol, and caffeine which may worsen symptoms.  During the first 24 hours (the first full day), follow the diet below:    Beverages. Sports drinks, soft drinks without caffeine, ginger ale, mineral water (plain or flavored), decaffeinated tea and coffee. If you are very dehydrated, sports drinks aren't a good choice. They have too much sugar and not enough electrolytes. In this case, commercially available products called oral rehydration solutions, are best.    Soups. Eat clear broth, consommé, and bouillon.    Desserts. Eat gelatin, ice pops, and fruit juice bars.  During the next 24 hours (the second day), you may add the following to the above:    Hot cereal, plain toast, bread, rolls, and crackers    Plain noodles, rice, mashed potatoes, chicken noodle or rice soup    Unsweetened canned fruit (avoid pineapple), bananas    Limit fat intake to less than 15 grams  per day. Do this by avoiding margarine, butter, oils, mayonnaise, sauces, gravies, fried foods, peanut butter, meat, poultry, and fish.    Limit fiber and avoid raw or cooked vegetables, fresh fruits (except bananas), and bran cereals.    Limit caffeine and chocolate. Don't use spices or seasonings other than salt.    Limit dairy products.    Avoid alcohol.  During the next 24 hours:    Gradually resume a normal diet as you feel better and your symptoms improve.    If at any time it starts getting worse again, go back to clear liquids until you feel better.  Follow-up care  Follow up with your healthcare provider, or as advised. Call your provider if you don't get better within 24 hours or if diarrhea lasts more than a week. Also follow up if you are unable to keep down liquids and get dehydrated. If a stool (diarrhea) sample was taken, call as directed for the results.  Call 911  Call 911 if any of these occur:    Trouble breathing    Chest pain    Confused    Severe drowsiness or trouble awakening    Fainting or loss of consciousness    Rapid heart rate    Seizure    Stiff neck   When to seek medical advice  Call your healthcare provider right away if any of these occur:    Abdominal pain that gets worse    Continued vomiting (unable to keep liquids down)    Frequent diarrhea (more than 5 times a day)    Blood in vomit or stool (black or red color)    Dark urine, reduced urine output, or extreme thirst    Weakness or dizziness    Drowsiness    Fever of 100.4 F (38 C) or higher, or as directed by your healthcare provider    New rash  Date Last Reviewed: 6/1/2018 2000-2018 [employer]. [address] [place] [address], [place] [address]. All rights reserved. This information is not intended as a substitute for professional medical care. Always follow your healthcare professional's instructions.

## 2019-01-02 ENCOUNTER — OFFICE VISIT (OUTPATIENT)
Dept: FAMILY MEDICINE | Facility: CLINIC | Age: 24
End: 2019-01-02
Payer: COMMERCIAL

## 2019-01-02 ENCOUNTER — ANCILLARY PROCEDURE (OUTPATIENT)
Dept: GENERAL RADIOLOGY | Facility: CLINIC | Age: 24
End: 2019-01-02
Payer: COMMERCIAL

## 2019-01-02 VITALS
OXYGEN SATURATION: 98 % | SYSTOLIC BLOOD PRESSURE: 108 MMHG | BODY MASS INDEX: 17.69 KG/M2 | HEART RATE: 104 BPM | WEIGHT: 131 LBS | TEMPERATURE: 102 F | RESPIRATION RATE: 18 BRPM | DIASTOLIC BLOOD PRESSURE: 66 MMHG

## 2019-01-02 DIAGNOSIS — R50.9 FEVER, UNSPECIFIED: ICD-10-CM

## 2019-01-02 DIAGNOSIS — B27.90 INFECTIOUS MONONUCLEOSIS WITHOUT COMPLICATION, INFECTIOUS MONONUCLEOSIS DUE TO UNSPECIFIED ORGANISM: Primary | ICD-10-CM

## 2019-01-02 LAB
DIFFERENTIAL METHOD BLD: ABNORMAL
EOSINOPHIL # BLD AUTO: 0.2 10E9/L (ref 0–0.7)
EOSINOPHIL NFR BLD AUTO: 1 %
ERYTHROCYTE [DISTWIDTH] IN BLOOD BY AUTOMATED COUNT: 13.5 % (ref 10–15)
HCT VFR BLD AUTO: 41.4 % (ref 40–53)
HETEROPH AB SER QL: POSITIVE
HGB BLD-MCNC: 14.2 G/DL (ref 13.3–17.7)
LYMPHOCYTES # BLD AUTO: 9.6 10E9/L (ref 0.8–5.3)
LYMPHOCYTES NFR BLD AUTO: 63 %
MCH RBC QN AUTO: 31.4 PG (ref 26.5–33)
MCHC RBC AUTO-ENTMCNC: 34.3 G/DL (ref 31.5–36.5)
MCV RBC AUTO: 92 FL (ref 78–100)
MONOCYTES # BLD AUTO: 1.5 10E9/L (ref 0–1.3)
MONOCYTES NFR BLD AUTO: 10 %
NEUTROPHILS # BLD AUTO: 4 10E9/L (ref 1.6–8.3)
NEUTROPHILS NFR BLD AUTO: 26 %
PLATELET # BLD AUTO: 125 10E9/L (ref 150–450)
PLATELET # BLD EST: ABNORMAL 10*3/UL
RBC # BLD AUTO: 4.52 10E12/L (ref 4.4–5.9)
RBC MORPH BLD: NORMAL
VARIANT LYMPHS BLD QL SMEAR: PRESENT
WBC # BLD AUTO: 15.3 10E9/L (ref 4–11)

## 2019-01-02 PROCEDURE — 36415 COLL VENOUS BLD VENIPUNCTURE: CPT | Performed by: PHYSICIAN ASSISTANT

## 2019-01-02 PROCEDURE — 85025 COMPLETE CBC W/AUTO DIFF WBC: CPT | Performed by: PHYSICIAN ASSISTANT

## 2019-01-02 PROCEDURE — 86308 HETEROPHILE ANTIBODY SCREEN: CPT | Performed by: PHYSICIAN ASSISTANT

## 2019-01-02 PROCEDURE — 99213 OFFICE O/P EST LOW 20 MIN: CPT | Performed by: PHYSICIAN ASSISTANT

## 2019-01-02 PROCEDURE — 71046 X-RAY EXAM CHEST 2 VIEWS: CPT

## 2019-01-02 ASSESSMENT — PAIN SCALES - GENERAL: PAINLEVEL: NO PAIN (0)

## 2019-01-02 NOTE — PROGRESS NOTES
SUBJECTIVE:   Sivakumar Alonzo is a 23 year old male who presents to clinic today for the following health issues:    He was seen in  and test for strep negative.   Test for influenza negative.   He continues to have the following symptoms.       ENT Symptoms             Symptoms: cc Present Absent Comment   Fever/Chills  x  both   Fatigue  x     Muscle Aches  x     Eye Irritation   x    Sneezing   x    Nasal Mark/Drg   x    Sinus Pressure/Pain  x  Only when getting headache   Loss of smell   x    Dental pain   x    Sore Throat  x     Swollen Glands  x     Ear Pain/Fullness   x    Cough   x A little   Wheeze   x    Chest Pain  x  Tightness   Shortness of breath  x     Rash   x    Other  x  headache     Symptom duration:  2 weeks   Symptom severity:  severe   Treatments tried:  ibuprofen, tylenol, and naproxen    Contacts:             Problem list and histories reviewed & adjusted, as indicated.  Additional history: as documented    Patient Active Problem List   Diagnosis     Asthma     Mixed anxiety depressive disorder     Hx borderline personality disorder (H)     History reviewed. No pertinent surgical history.    Social History     Tobacco Use     Smoking status: Never Smoker     Smokeless tobacco: Never Used   Substance Use Topics     Alcohol use: No     History reviewed. No pertinent family history.      Allergies   Allergen Reactions     Cefzil [Cefprozil] Hives     Codeine      Extremely sensitive to this drug - be careful with dose     Sulfa Drugs Swelling     Vancomycin      Amoxicillin Rash     Nystatin Rash       Reviewed and updated as needed this visit by clinical staff       Reviewed and updated as needed this visit by Provider         ROS:  Constitutional, HEENT, cardiovascular, pulmonary, GI, , musculoskeletal, neuro, skin, endocrine and psych systems are negative, except as otherwise noted.    OBJECTIVE:     /66   Pulse 104   Temp 102  F (38.9  C) (Oral)   Resp 18   Wt 59.4 kg (131 lb)    SpO2 98%   BMI 17.69 kg/m    Body mass index is 17.69 kg/m .  GENERAL: healthy, alert and no distress  EYES: Eyes grossly normal to inspection, PERRL and conjunctivae and sclerae normal  HENT: normal cephalic/atraumatic, ear canals and TM's normal, nose and mouth without ulcers or lesions, oropharynx clear, oral mucous membranes moist, tonsillar hypertrophy, tonsillar erythema and tonsillar exudate  NECK: bilateral anterior and posterior cervical adenopathy and thyroid normal to palpation  RESP: lungs clear to auscultation - no rales, rhonchi or wheezes  CV: regular rate and rhythm, normal S1 S2, no S3 or S4, no murmur, click or rub, no peripheral edema and peripheral pulses strong  MS: no gross musculoskeletal defects noted, no edema  SKIN: no suspicious lesions or rashes  NEURO: Normal strength and tone, mentation intact and speech normal  PSYCH: mentation appears normal, affect normal/bright    Diagnostic Test Results:  Results for orders placed or performed in visit on 01/02/19   XR Chest 2 Views    Narrative    XR CHEST 2 VW 1/2/2019 2:50 PM     HISTORY: Fever, unspecified    COMPARISON: None      Impression    IMPRESSION: There is streaky infiltrate in the right lower lobe,  likely pneumonia. The left lung is clear. There is no pleural  effusion. Heart size is normal.    ZEINA ROYAL MD   CBC with platelets differential   Result Value Ref Range    WBC 15.3 (H) 4.0 - 11.0 10e9/L    RBC Count 4.52 4.4 - 5.9 10e12/L    Hemoglobin 14.2 13.3 - 17.7 g/dL    Hematocrit 41.4 40.0 - 53.0 %    MCV 92 78 - 100 fl    MCH 31.4 26.5 - 33.0 pg    MCHC 34.3 31.5 - 36.5 g/dL    RDW 13.5 10.0 - 15.0 %    Platelet Count 125 (L) 150 - 450 10e9/L    % Neutrophils 26.0 %    % Lymphocytes 63.0 %    % Monocytes 10.0 %    % Eosinophils 1.0 %    Absolute Neutrophil 4.0 1.6 - 8.3 10e9/L    Absolute Lymphocytes 9.6 (H) 0.8 - 5.3 10e9/L    Absolute Monocytes 1.5 (H) 0.0 - 1.3 10e9/L    Absolute Eosinophils 0.2 0.0 - 0.7 10e9/L     Reactive Lymphs Present     RBC Morphology Normal     Platelet Estimate       Automated count confirmed.  Platelet morphology is normal.    Diff Method Automated Method    Mononucleosis screen   Result Value Ref Range    Mononucleosis Screen Positive (A) NEG^Negative       ASSESSMENT/PLAN:       1. Infectious mononucleosis without complication, infectious mononucleosis due to unspecified organism  Continue with symptomatic treatments with OTC medications also, rest and fluids.   He was given a work note and will write for restrictions if needed.   Handout given with follow up instructions / information about Mono and treatments. If fever persists, he will notify.   - CBC with platelets differential  - Mononucleosis screen  - XR Chest 2 Views      Kristen M. Kehr, PA-C  [facility]

## 2019-01-02 NOTE — NURSING NOTE
"Chief Complaint   Patient presents with     Sick       Initial /66   Pulse 104   Temp 102  F (38.9  C) (Oral)   Resp 18   Wt 59.4 kg (131 lb)   SpO2 98%   BMI 17.69 kg/m   Estimated body mass index is 17.69 kg/m  as calculated from the following:    Height as of 9/6/18: 1.833 m (6' 0.17\").    Weight as of this encounter: 59.4 kg (131 lb).  Medication Reconciliation: complete    REYES Ramos MA    "

## 2019-01-02 NOTE — LETTER
[facility]  [address] [facility] [address]  Phone: [phone]    January 2, 2019        Sivakumar Alonzo  [address]  [facility] [address]          To whom it may concern:    RE: Sivakumar Alonzo    Patient was seen and treated today at our clinic and missed work.   He has mononucleosis and will need to be excused from work until he no longer has a fever and energy level is back to normal.     Please contact me for questions or concerns.      Sincerely,        Kristen M. Kehr, PA-C

## 2019-01-02 NOTE — PATIENT INSTRUCTIONS
Patient Education     Mononucleosis  Mononucleosis (also called mono) is a contagious viral infection. Most infants and children exposed to the virus get only mild flu-like symptoms or no symptoms at all. However, infection is usually more serious in teens and young adults. While the virus is active, it causes symptoms and can spread to others. After symptoms subside, the virus stays in the body and eventually becomes inactive. The virus can reactivate and develop symptoms, especially in people with weak immune systems.   The virus is usually spread by contact with saliva, often by kissing, or sharing food or eating utensils. It may also spread by breastmilk, blood, or sexual contact. It takes about 4 to 6 weeks to develop symptoms after exposure.  Early symptoms include headache, nausea, tiredness and general muscle aching. This is followed by sore throat and fever. Lymph glands in the neck, under the arms, or in the groin may be swollen. Symptoms usually go away in about 1 to 2 months. But they can last up to 4 months.  In the first few days to weeks, a common blood test (the monospot test) used to diagnose this disease may be negative even though you have the illness. In this case, other tests may be done.  Taking the antibiotics ampicillin or amoxicillin during a mono infection may cause a skin rash. This is not serious and will fade in about a week. The rash may not mean you are having an allergic reaction to the antibiotic.   Mono can cause your spleen to swell. The spleen is a fist-sized organ in the upper left abdomen that stores red blood cells. Injury to a swollen spleen can cause the spleen to rupture. This can cause life-threatening internal bleeding. To prevent this from happening, don't play contact sports or do strenuous activity for 8 weeks, or until your healthcare provider says it's OK. A sharp blow or pressure to the area could rupture a swollen spleen  Home care    Rest in bed until the fever  and weakness have gone away.    Drink plenty of fluids, but don't drink alcohol. Otherwise, you may eat a regular diet.    Ask your healthcare provider about using over-the-counter medicines to treat symptoms such as fever, pain, or an itchy rash.    Over-the-counter throat lozenges may help soothe a sore throat. Gargling with warm salt water (1/2 teaspoon in 1 glass of warm water) may also be soothing to the throat.    You may return to work or school after the fever goes away and you are feeling better. Continue to follow any activity restrictions you have been given.  Preventing spread of the virus  To limit the spread of the virus, don't expose others to your saliva for at least 6 months after your illness (no kissing or sharing utensils, drinking glasses, or toothbrushes).  Follow-up care  Follow up with your healthcare provider within 1 to 2 weeks, or as advised to be sure that there are no complications. If symptoms of extreme fatigue and swollen glands last longer than 6 months, see your healthcare provider for further testing.  When to seek medical advice  Call your healthcare provider right away if any of the following occur:    Excessive coughing    Yellow skin or eyes    Trouble swallowing    Dizziness    Paleness   Call 911  Call 911 if any of the following occur:    Severe or worsening abdominal pain    Trouble breathing   Date Last Reviewed: 3/1/2018    5150-0253 [employer]. [address] [place] [address]. All rights reserved. This information is not intended as a substitute for professional medical care. Always follow your healthcare professional's instructions.        Results for orders placed or performed in visit on 01/02/19   CBC with platelets differential   Result Value Ref Range    WBC 15.3 (H) 4.0 - 11.0 10e9/L    RBC Count 4.52 4.4 - 5.9 10e12/L    Hemoglobin 14.2 13.3 - 17.7 g/dL    Hematocrit 41.4 40.0 - 53.0 %    MCV 92 78 - 100 fl    MCH 31.4 26.5 - 33.0 pg     MCHC 34.3 31.5 - 36.5 g/dL    RDW 13.5 10.0 - 15.0 %    Platelet Count 125 (L) 150 - 450 10e9/L    Diff Method PENDING    Mononucleosis screen   Result Value Ref Range    Mononucleosis Screen Positive (A) NEG^Negative

## 2019-01-22 ENCOUNTER — OFFICE VISIT (OUTPATIENT)
Dept: FAMILY MEDICINE | Facility: CLINIC | Age: 24
End: 2019-01-22
Payer: COMMERCIAL

## 2019-01-22 VITALS
WEIGHT: 134 LBS | OXYGEN SATURATION: 100 % | RESPIRATION RATE: 18 BRPM | HEART RATE: 81 BPM | SYSTOLIC BLOOD PRESSURE: 121 MMHG | TEMPERATURE: 97.2 F | BODY MASS INDEX: 18.09 KG/M2 | DIASTOLIC BLOOD PRESSURE: 74 MMHG

## 2019-01-22 DIAGNOSIS — B27.90 MONONUCLEOSIS: Primary | ICD-10-CM

## 2019-01-22 PROCEDURE — 99213 OFFICE O/P EST LOW 20 MIN: CPT | Performed by: PHYSICIAN ASSISTANT

## 2019-01-22 ASSESSMENT — ANXIETY QUESTIONNAIRES
GAD7 TOTAL SCORE: 5
1. FEELING NERVOUS, ANXIOUS, OR ON EDGE: SEVERAL DAYS
7. FEELING AFRAID AS IF SOMETHING AWFUL MIGHT HAPPEN: NOT AT ALL
3. WORRYING TOO MUCH ABOUT DIFFERENT THINGS: NOT AT ALL
2. NOT BEING ABLE TO STOP OR CONTROL WORRYING: NOT AT ALL
6. BECOMING EASILY ANNOYED OR IRRITABLE: MORE THAN HALF THE DAYS
IF YOU CHECKED OFF ANY PROBLEMS ON THIS QUESTIONNAIRE, HOW DIFFICULT HAVE THESE PROBLEMS MADE IT FOR YOU TO DO YOUR WORK, TAKE CARE OF THINGS AT HOME, OR GET ALONG WITH OTHER PEOPLE: NOT DIFFICULT AT ALL
5. BEING SO RESTLESS THAT IT IS HARD TO SIT STILL: MORE THAN HALF THE DAYS

## 2019-01-22 ASSESSMENT — PATIENT HEALTH QUESTIONNAIRE - PHQ9
SUM OF ALL RESPONSES TO PHQ QUESTIONS 1-9: 3
5. POOR APPETITE OR OVEREATING: NOT AT ALL

## 2019-01-22 ASSESSMENT — PAIN SCALES - GENERAL: PAINLEVEL: NO PAIN (0)

## 2019-01-22 NOTE — LETTER
My Depression Action Plan  Name: Sivakumar Alonzo   Date of Birth 1995  Date: 1/22/2019    My doctor: [facility]   [facility]  [address] [facility] [address] [phone]          GREEN    ZONE   Good Control    What it looks like:     Things are going generally well. You have normal up s and down s. You may even feel depressed from time to time, but bad moods usually last less than a day.   What you need to do:  1. Continue to care for yourself (see self care plan)  2. Check your depression survival kit and update it as needed  3. Follow your physician s recommendations including any medication.  4. Do not stop taking medication unless you consult with your physician first.           YELLOW         ZONE Getting Worse    What it looks like:     Depression is starting to interfere with your life.     It may be hard to get out of bed; you may be starting to isolate yourself from others.    Symptoms of depression are starting to last most all day and this has happened for several days.     You may have suicidal thoughts but they are not constant.   What you need to do:     1. Call your care team, your response to treatment will improve if you keep your care team informed of your progress. Yellow periods are signs an adjustment may need to be made.     2. Continue your self-care, even if you have to fake it!    3. Talk to someone in your support network    4. Open up your depression survival kit           RED    ZONE Medical Alert - Get Help    What it looks like:     Depression is seriously interfering with your life.     You may experience these or other symptoms: You can t get out of bed most days, can t work or engage in other necessary activities, you have trouble taking care of basic hygiene, or basic responsibilities, thoughts of suicide or death that will not go away, self-injurious behavior.     What you need to do:  1. Call your care team and  request a same-day appointment. If they are not available (weekends or after hours) call your local crisis line, emergency room or 911.            Depression Self Care Plan / Survival Kit    Self-Care for Depression  Here s the deal. Your body and mind are really not as separate as most people think.  What you do and think affects how you feel and how you feel influences what you do and think. This means if you do things that people who feel good do, it will help you feel better.  Sometimes this is all it takes.  There is also a place for medication and therapy depending on how severe your depression is, so be sure to consult with your medical provider and/ or Behavioral Health Consultant if your symptoms are worsening or not improving.     In order to better manage my stress, I will:    Exercise  Get some form of exercise, every day. This will help reduce pain and release endorphins, the  feel good  chemicals in your brain. This is almost as good as taking antidepressants!  This is not the same as joining a gym and then never going! (they count on that by the way ) It can be as simple as just going for a walk or doing some gardening, anything that will get you moving.      Hygiene   Maintain good hygiene (Get out of bed in the morning, Make your bed, Brush your teeth, Take a shower, and Get dressed like you were going to work, even if you are unemployed).  If your clothes don't fit try to get ones that do.    Diet  I will strive to eat foods that are good for me, drink plenty of water, and avoid excessive sugar, caffeine, alcohol, and other mood-altering substances.  Some foods that are helpful in depression are: complex carbohydrates, B vitamins, flaxseed, fish or fish oil, fresh fruits and vegetables.    Psychotherapy  I agree to participate in Individual Therapy (if recommended).    Medication  If prescribed medications, I agree to take them.  Missing doses can result in serious side effects.  I understand that  drinking alcohol, or other illicit drug use, may cause potential side effects.  I will not stop my medication abruptly without first discussing it with my provider.    Staying Connected With Others  I will stay in touch with my friends, family members, and my primary care provider/team.    Use your imagination  Be creative.  We all have a creative side; it doesn t matter if it s oil painting, sand castles, or mud pies! This will also kick up the endorphins.    Witness Beauty  (AKA stop and smell the roses) Take a look outside, even in mid-winter. Notice colors, textures. Watch the squirrels and birds.     Service to others  Be of service to others.  There is always someone else in need.  By helping others we can  get out of ourselves  and remember the really important things.  This also provides opportunities for practicing all the other parts of the program.    Humor  Laugh and be silly!  Adjust your TV habits for less news and crime-drama and more comedy.    Control your stress  Try breathing deep, massage therapy, biofeedback, and meditation. Find time to relax each day.     My support system    Clinic Contact:  Phone number:    Contact 1:  Phone number:    Contact 2:  Phone number:    [facility]/:  Phone number:    Therapist:  Phone number:    Local crisis center:    Phone number:    Other community support:  Phone number:

## 2019-01-22 NOTE — PROGRESS NOTES
SUBJECTIVE:   Sivakumar Alonzo is a 23 year old male who presents to clinic today for the following health issues:      Mono follow up.   Sivakumar is feeling better, but still having fatigue.   He has been able to return to work for 6-8 hour shifts.   He continues to sleep 8-10 hours daily.   No longer having a sore throat, fever.   No abdominal pain.       Problem list and histories reviewed & adjusted, as indicated.  Additional history: as documented    Patient Active Problem List   Diagnosis     Asthma     Mixed anxiety depressive disorder     Hx borderline personality disorder (H)     History reviewed. No pertinent surgical history.    Social History     Tobacco Use     Smoking status: Never Smoker     Smokeless tobacco: Never Used   Substance Use Topics     Alcohol use: No     History reviewed. No pertinent family history.      Current Outpatient Medications   Medication Sig Dispense Refill     fexofenadine (ALLEGRA) 180 MG tablet Take 180 mg by mouth       Allergies   Allergen Reactions     Cefzil [Cefprozil] Hives     Codeine      Extremely sensitive to this drug - be careful with dose     Sulfa Drugs Swelling     Vancomycin      Amoxicillin Rash     Nystatin Rash       Reviewed and updated as needed this visit by clinical staff       Reviewed and updated as needed this visit by Provider         ROS:  Constitutional, HEENT, cardiovascular, pulmonary, GI, , musculoskeletal, neuro, skin, endocrine and psych systems are negative, except as otherwise noted.    OBJECTIVE:     /74   Pulse 81   Temp 97.2  F (36.2  C) (Oral)   Resp 18   Wt 60.8 kg (134 lb)   SpO2 100%   BMI 18.09 kg/m    Body mass index is 18.09 kg/m .  GENERAL: healthy, alert and no distress  EYES: Eyes grossly normal to inspection, PERRL and conjunctivae and sclerae normal  NECK: no adenopathy, no asymmetry, masses, or scars and thyroid normal to palpation  RESP: lungs clear to auscultation - no rales, rhonchi or wheezes  CV: regular  rate and rhythm, normal S1 S2, no S3 or S4, no murmur, click or rub, no peripheral edema and peripheral pulses strong  ABDOMEN: soft, nontender, no hepatosplenomegaly, no masses and bowel sounds normal  MS: no gross musculoskeletal defects noted, no edema    Diagnostic Test Results:  none     ASSESSMENT/PLAN:       1. Mononucleosis  He will continue with 6-8 hours shifts through 2/3/2019  Follow up appointment scheduled on 2/4/2019 to determine any further restrictions. Continue to rest, push fluids and symptomatic treatments with over the counter medications as needed.         Kristen M. Kehr, PA-C  [facility]

## 2019-01-22 NOTE — LETTER
[facility]  [address] [facility] [address]  Phone: [phone]    January 22, 2019        Sivakumar Alonzo  [address]  [facility] [address]          To whom it may concern:    RE: Sivakumar Alonzo    Patient was seen and treated today at our clinic.  He has mononucleosis and is able to return to work for 6-8 hour shifts starting 1/17/2019 through 2/3/2019.     He will have an appointment for follow up 2/4/2019.      Please contact me for questions or concerns.      Sincerely,        Kristen M. Kehr, PA-C

## 2019-01-22 NOTE — NURSING NOTE
"Chief Complaint   Patient presents with     RECHECK     mono       Initial /74   Pulse 81   Temp 97.2  F (36.2  C) (Oral)   Resp 18   Wt 60.8 kg (134 lb)   SpO2 100%   BMI 18.09 kg/m   Estimated body mass index is 18.09 kg/m  as calculated from the following:    Height as of 9/6/18: 1.833 m (6' 0.17\").    Weight as of this encounter: 60.8 kg (134 lb).  Medication Reconciliation: complete    REYES Ramos MA    "

## 2019-01-23 ASSESSMENT — ANXIETY QUESTIONNAIRES: GAD7 TOTAL SCORE: 5

## 2019-01-31 NOTE — PROGRESS NOTES
SUBJECTIVE:   Sivakumar Alonzo is a 23 year old male who presents to clinic today for the following health issues:      Follow up mono.   Sivakumar is feeling better and able to return to work without restrictions.         Problem list and histories reviewed & adjusted, as indicated.  Additional history: as documented    Patient Active Problem List   Diagnosis     Asthma     Mixed anxiety depressive disorder     Hx borderline personality disorder (H)     History reviewed. No pertinent surgical history.    Social History     Tobacco Use     Smoking status: Never Smoker     Smokeless tobacco: Never Used   Substance Use Topics     Alcohol use: No     History reviewed. No pertinent family history.      Current Outpatient Medications   Medication Sig Dispense Refill     fexofenadine (ALLEGRA) 180 MG tablet Take 180 mg by mouth       Allergies   Allergen Reactions     Cefzil [Cefprozil] Hives     Codeine      Extremely sensitive to this drug - be careful with dose     Sulfa Drugs Swelling     Vancomycin      Amoxicillin Rash     Nystatin Rash       Reviewed and updated as needed this visit by clinical staff       Reviewed and updated as needed this visit by Provider         ROS:  Constitutional, HEENT, cardiovascular, pulmonary, gi and gu systems are negative, except as otherwise noted.    OBJECTIVE:     /67   Pulse 81   Temp 97.6  F (36.4  C) (Oral)   Ht 1.829 m (6')   Wt 61.2 kg (135 lb)   SpO2 98%   BMI 18.31 kg/m    Body mass index is 18.31 kg/m .  GENERAL: healthy, alert and no distress  MS: no gross musculoskeletal defects noted, no edema    Diagnostic Test Results:  none     ASSESSMENT/PLAN:       1. Mononucleosis  He is feeling well and able to return to work  Letter given to return without restrictions.         Kristen M. Kehr, PA-C  [facility]

## 2019-02-04 ENCOUNTER — OFFICE VISIT (OUTPATIENT)
Dept: FAMILY MEDICINE | Facility: CLINIC | Age: 24
End: 2019-02-04
Payer: COMMERCIAL

## 2019-02-04 VITALS
DIASTOLIC BLOOD PRESSURE: 67 MMHG | HEIGHT: 72 IN | WEIGHT: 135 LBS | HEART RATE: 81 BPM | BODY MASS INDEX: 18.28 KG/M2 | SYSTOLIC BLOOD PRESSURE: 108 MMHG | TEMPERATURE: 97.6 F | OXYGEN SATURATION: 98 %

## 2019-02-04 DIAGNOSIS — B27.90 MONONUCLEOSIS: Primary | ICD-10-CM

## 2019-02-04 PROCEDURE — 99213 OFFICE O/P EST LOW 20 MIN: CPT | Performed by: PHYSICIAN ASSISTANT

## 2019-02-04 ASSESSMENT — MIFFLIN-ST. JEOR: SCORE: 1635.36

## 2019-02-04 NOTE — LETTER
[facility]  [address] [facility] [address]  Phone: [phone]    February 4, 2019        Sivakumar Alonzo  [address]  [facility] [address]          To whom it may concern:    RE: Sivakumar Alonzo    Patient was seen and treated today at our clinic.    He had mononucleosis and had work restrictions. He is now able to return to work 2/4/2019 without restrictions.     Please contact me for questions or concerns.      Sincerely,        Kristen M. Kehr, PA-C

## 2019-02-05 ENCOUNTER — OFFICE VISIT (OUTPATIENT)
Dept: FAMILY MEDICINE | Facility: CLINIC | Age: 24
End: 2019-02-05
Payer: COMMERCIAL

## 2019-02-05 ENCOUNTER — ANCILLARY PROCEDURE (OUTPATIENT)
Dept: GENERAL RADIOLOGY | Facility: CLINIC | Age: 24
End: 2019-02-05
Attending: INTERNAL MEDICINE
Payer: COMMERCIAL

## 2019-02-05 VITALS
TEMPERATURE: 98.5 F | RESPIRATION RATE: 16 BRPM | SYSTOLIC BLOOD PRESSURE: 98 MMHG | HEART RATE: 73 BPM | WEIGHT: 133 LBS | OXYGEN SATURATION: 97 % | BODY MASS INDEX: 18.04 KG/M2 | DIASTOLIC BLOOD PRESSURE: 66 MMHG

## 2019-02-05 DIAGNOSIS — S69.91XA WRIST INJURY, RIGHT, INITIAL ENCOUNTER: ICD-10-CM

## 2019-02-05 DIAGNOSIS — S62.164A NONDISPLACED FRACTURE OF PISIFORM, RIGHT WRIST, INITIAL ENCOUNTER FOR CLOSED FRACTURE: Primary | ICD-10-CM

## 2019-02-05 DIAGNOSIS — Z91.81 PERSONAL HISTORY OF FALL: ICD-10-CM

## 2019-02-05 PROCEDURE — 99213 OFFICE O/P EST LOW 20 MIN: CPT | Performed by: INTERNAL MEDICINE

## 2019-02-05 PROCEDURE — 73110 X-RAY EXAM OF WRIST: CPT | Mod: RT

## 2019-02-05 NOTE — LETTER
February 5, 2019      Sivakumar Alonzo  [address] [facility] [address]        To Whom It May Concern:    Sivakumar Alonzo was seen in our clinic. He is not to lift, carry, push, or pull more than 3 lbs through 2/12/2019.   He may work without restrictions starting 2/13/2019.      Sincerely,        Mary Rivas MD

## 2019-02-05 NOTE — PROGRESS NOTES
SUBJECTIVE:  Sivakumar Alonzo is an 23 year old male who presents for right wrist injury.  Slipped on ice this morning and hurt wrist.  Landed on hip and elbow and apparently wrist since the wrist hurts.  No loc or head trauma.  Is left handed.  A little bruising of the wrist.  Maybe a little swelling.  Feels stiff in wrist.  Hurts to move it.  No prior injuries or surgeries of wrist.  No meds taken.  Did elevate it some.  No lightheadedness or dizziness before or after the fall.    PMH:   has no past medical history on file.  Patient Active Problem List   Diagnosis     Asthma     Mixed anxiety depressive disorder     Hx borderline personality disorder (H)     Social History     Socioeconomic History     Marital status: Single     Spouse name: None     Number of children: None     Years of education: None     Highest education level: None   Social Needs     Financial resource strain: None     Food insecurity - worry: None     Food insecurity - inability: None     Transportation needs - medical: None     Transportation needs - non-medical: None   Occupational History     None   Tobacco Use     Smoking status: Never Smoker     Smokeless tobacco: Never Used   Substance and Sexual Activity     Alcohol use: No     Drug use: No     Sexual activity: No   Other Topics Concern     Parent/sibling w/ CABG, MI or angioplasty before 65F 55M? Not Asked   Social History Narrative     None     No family history on file.    ALLERGIES:  Cefzil [cefprozil]; Codeine; Sulfa drugs; Vancomycin; Amoxicillin; and Nystatin    Current Outpatient Medications   Medication     fexofenadine (ALLEGRA) 180 MG tablet     No current facility-administered medications for this visit.          ROS:  ROS is done and is negative for general/constitutional, eye, ENT, Respiratory, cardiovascular, GI, , Skin, musculoskeletal except as noted elsewhere.  All other review of systems negative except as noted elsewhere.      OBJECTIVE:  BP 98/66   Pulse 73    Temp 98.5  F (36.9  C) (Oral)   Resp 16   Wt 60.3 kg (133 lb)   SpO2 97%   BMI 18.04 kg/m    GENERAL APPEARANCE: Alert, in no acute distress  EYES: normal  SKIN: no ulcers, lesions or rash  MUSCULOSKELETAL:right wrist - mild edema of ulnar aspect of wrist, no bruising, is tender over ulnar aspect with very limited rom due to pain with any rom testing.  No erythema.  Sensation of hand intact.  Cap refill <2 sec.  Fingers, elbow, and remainder of arm are non-tender and have normal rom.      RESULTS  .  Recent Results (from the past 48 hour(s))   XR Wrist Right G/E 3 Views    Narrative    RIGHT WRIST THREE OR MORE VIEWS  2/5/2019 11:08 AM     HISTORY: Wrist injury, right, initial encounter. Personal history of  fall.    COMPARISON: None.      Impression    IMPRESSION: Lucency noted through the proximal aspect of the pisiform  corresponding to minimally distracted fracture. Surrounding soft  tissue swelling. Overall alignment remains intact.       ASSESSMENT/PLAN:    ASSESSMENT / PLAN:  (S62.164A) Nondisplaced fracture of pisiform, right wrist, initial encounter for closed fracture  (primary encounter diagnosis)  Comment: seen on xray, reported by radiology  Plan: ORTHO  REFERRAL        Refer to ortho.  Placed in splint and sling for comfort.  F/u with ortho within 1-2 days.  I reviewed supportive care including icing, ibuprofen, rest of the area, and elevation, otc meds to use if needed, expected course, and signs of concern.  Follow up with ortho in 1-2 days or sooner if worsens in any way.  Reviewed red flag symptoms and is to go to the ER if experiences any of these.  Pt informed of fx on xray just after visit.    (S69.91XA) Wrist injury, right, initial encounter  Comment: from fall.  Plan: XR Wrist Right G/E 3 Views        fx as above    (Z91.81) Personal history of fall  Comment: slipped on ice  Plan: XR Wrist Right G/E 3 Views        fx as above.      See [facility] for orders, medications, letters,  patient instructions    Mary Rivas M.D.

## 2019-02-05 NOTE — NURSING NOTE
Chief Complaint   Patient presents with     Wrist Injury     slipped on ice and fell this morning at approx 630. Landed on RT wrist       Initial BP 98/66   Pulse 73   Temp 98.5  F (36.9  C) (Oral)   Resp 16   Wt 60.3 kg (133 lb)   SpO2 97%   BMI 18.04 kg/m   Estimated body mass index is 18.04 kg/m  as calculated from the following:    Height as of 2/4/19: 1.829 m (6').    Weight as of this encounter: 60.3 kg (133 lb)..  BP completed using cuff size: regular

## 2019-02-07 ENCOUNTER — OFFICE VISIT (OUTPATIENT)
Dept: ORTHOPEDICS | Facility: CLINIC | Age: 24
End: 2019-02-07
Payer: COMMERCIAL

## 2019-02-07 VITALS
HEART RATE: 94 BPM | WEIGHT: 133 LBS | OXYGEN SATURATION: 99 % | HEIGHT: 72 IN | BODY MASS INDEX: 18.01 KG/M2 | SYSTOLIC BLOOD PRESSURE: 120 MMHG | DIASTOLIC BLOOD PRESSURE: 78 MMHG

## 2019-02-07 DIAGNOSIS — S62.164A NONDISPLACED FRACTURE OF PISIFORM, RIGHT WRIST, INITIAL ENCOUNTER FOR CLOSED FRACTURE: Primary | ICD-10-CM

## 2019-02-07 PROCEDURE — 25630 CLTX CARPL FX W/O MNPJ EA B1: CPT | Performed by: ORTHOPAEDIC SURGERY

## 2019-02-07 ASSESSMENT — MIFFLIN-ST. JEOR: SCORE: 1626.28

## 2019-02-07 NOTE — PROGRESS NOTES
SUBJECTIVE:  Sivakumar Alonzo is a 23 year old male who is seen in consultation at the request of Dr. Rivas for  right wrist injury that occurred.   Cause: Following acute injury.  Mechanism of injury: Slip and fall on ice 2/5/19. Patient was going down a slope and attempted to reach his car handle. Was unable to grab it, slipped and fell, injuring his right hip, elbow and the back of the head. He does not recall injuring the right hand or wrist. He went to work the later in the day, where he went to grab something at work, and had immediate wrist pain. Was seen at urgent care, x-rays taken. Was given a Velcro wrist brace and sling. He presents with both wrist brace and sling today and he reports wrist brace has been greatly helping improve his symptoms.    Symptoms: ulnar wrist pain.  Previous treatments:  Wrist brace, sling     Prior history of related problems: no prior problems with this area in the past.    Patients past medical, surgical, social and family histories reviewed.  Past medical history notable for: none  No past medical history on file.   No past surgical history on file.   No family history on file.    REVIEW OF SYSTEMS:  CONSTITUTIONAL:  NEGATIVE for fever, chills, change in weight  INTEGUMENTARY/SKIN:  NEGATIVE for worrisome rashes, moles or lesions  EYES:  NEGATIVE for vision changes or irritation  ENT/MOUTH:  NEGATIVE for ear, mouth and throat problems  RESP:  NEGATIVE for significant cough or SOB  BREAST:  NEGATIVE for masses, tenderness or discharge  CV:  NEGATIVE for chest pain, palpitations or peripheral edema  GI:  NEGATIVE for nausea, abdominal pain, heartburn, or change in bowel habits  :  Negative   MUSCULOSKELETAL:  See HPI above  NEURO:  NEGATIVE for weakness, dizziness or paresthesias  ENDOCRINE:  NEGATIVE for temperature intolerance, skin/hair changes  HEME/ALLERGY/IMMUNE:  NEGATIVE for bleeding problems  PSYCHIATRIC:  NEGATIVE for changes in mood or affect    This document serves  as a record of the services and decisions personally performed and made by MALIA White MD. It was created on his behalf by Aparna Smith, a trained medical scribe. The creation of this document is based the provider's statements to the medical scribe.    Scribe Aparna Smith 11:58 AM 2/7/2019      Vitals: Vitals: /78 (BP Location: Left arm, Patient Position: Sitting, Cuff Size: Adult Regular)   Pulse 94   Ht 1.829 m (6')   Wt 60.3 kg (133 lb)   SpO2 99%   BMI 18.04 kg/m    EXAM:  GENERAL APPEARANCE: healthy, alert and no distress   GAIT:NORMAL  SKIN: no suspicious lesions or rashes  NEURO: Normal strength and tone, mentation intact and speech normal  PSYCH:  mentation appears normal and affect normal/bright    MUSCULOSKELETAL:  WRIST:  Inspection: fullness of the ulnar wrist.  Palpation:   Tender: pisiform volarly and ulnar aspect. Tender along hamate bone as well.    Nontender over triquetrum.       Range of Motion: flexion*:  full, extension*:  Decreased      X-RAY INTERPRETATION  Wrist radiographs: 2/5/19 right wrist: IMPRESSION: Lucency noted through the proximal aspect of the pisiform  corresponding to minimally distracted fracture. Surrounding soft  tissue swelling. Overall alignment remains intact.    ASSESSMENT:  Encounter Diagnosis   Name Primary?     Nondisplaced fracture of pisiform, right wrist, initial encounter for closed fracture Yes        PLAN:  Fractures such as this can take up to 6-8 weeks before symptomatically healed. Advised him to continue wearing the wrist brace, as this seems to improve symptoms. Ok to take off for shower, otherwise on 24/7, until symptoms resolve.     Follow up in 6-8 weeks as needed     The information in this document, created by a scribe for me, accurately reflects the services I personally performed and the decisions made by me. I have reviewed and approved this document for accuracy.      MALIA White MD  Dept. Orthopedic Surgery  [facility]  Services

## 2019-02-07 NOTE — LETTER
2/7/2019         RE: Sivakumar Alonzo  [address] [facility] [place] [address]        Dear Colleague,    Thank you for referring your patient, Sivakumar Alonzo, to the [facility]. Please see a copy of my visit note below.    SUBJECTIVE:  Sivakumar Alonzo is a 23 year old male who is seen in consultation at the request of Dr. Rivas for  right wrist injury that occurred.   Cause: Following acute injury.  Mechanism of injury: Slip and fall on ice 2/5/19. Patient was going down a slope and attempted to reach his car handle. Was unable to grab it, slipped and fell, injuring his right hip, elbow and the back of the head. He does not recall injuring the right hand or wrist. He went to work the later in the day, where he went to grab something at work, and had immediate wrist pain. Was seen at urgent care, x-rays taken. Was given a Velcro wrist brace and sling. He presents with both wrist brace and sling today and he reports wrist brace has been greatly helping improve his symptoms.    Symptoms: ulnar wrist pain.  Previous treatments:  Wrist brace, sling     Prior history of related problems: no prior problems with this area in the past.    Patients past medical, surgical, social and family histories reviewed.  Past medical history notable for: none  No past medical history on file.   No past surgical history on file.   No family history on file.    REVIEW OF SYSTEMS:  CONSTITUTIONAL:  NEGATIVE for fever, chills, change in weight  INTEGUMENTARY/SKIN:  NEGATIVE for worrisome rashes, moles or lesions  EYES:  NEGATIVE for vision changes or irritation  ENT/MOUTH:  NEGATIVE for ear, mouth and throat problems  RESP:  NEGATIVE for significant cough or SOB  BREAST:  NEGATIVE for masses, tenderness or discharge  CV:  NEGATIVE for chest pain, palpitations or peripheral edema  GI:  NEGATIVE for nausea, abdominal pain, heartburn, or change in bowel habits  :  Negative   MUSCULOSKELETAL:  See HPI above  NEURO:  NEGATIVE for  weakness, dizziness or paresthesias  ENDOCRINE:  NEGATIVE for temperature intolerance, skin/hair changes  HEME/ALLERGY/IMMUNE:  NEGATIVE for bleeding problems  PSYCHIATRIC:  NEGATIVE for changes in mood or affect    This document serves as a record of the services and decisions personally performed and made by MALIA White MD. It was created on his behalf by Aparna Smith, a trained medical scribe. The creation of this document is based the provider's statements to the medical scribe.    Scribe Aparna Smith 11:58 AM 2/7/2019      Vitals: Vitals: /78 (BP Location: Left arm, Patient Position: Sitting, Cuff Size: Adult Regular)   Pulse 94   Ht 1.829 m (6')   Wt 60.3 kg (133 lb)   SpO2 99%   BMI 18.04 kg/m     EXAM:  GENERAL APPEARANCE: healthy, alert and no distress   GAIT:NORMAL  SKIN: no suspicious lesions or rashes  NEURO: Normal strength and tone, mentation intact and speech normal  PSYCH:  mentation appears normal and affect normal/bright    MUSCULOSKELETAL:  WRIST:  Inspection: fullness of the ulnar wrist.  Palpation:   Tender: pisiform volarly and ulnar aspect. Tender along hamate bone as well.    Nontender over triquetrum.       Range of Motion: flexion*:  full, extension*:  Decreased      X-RAY INTERPRETATION  Wrist radiographs: 2/5/19 right wrist: IMPRESSION: Lucency noted through the proximal aspect of the pisiform  corresponding to minimally distracted fracture. Surrounding soft  tissue swelling. Overall alignment remains intact.    ASSESSMENT:  Encounter Diagnosis   Name Primary?     Nondisplaced fracture of pisiform, right wrist, initial encounter for closed fracture Yes        PLAN:  Fractures such as this can take up to 6-8 weeks before symptomatically healed. Advised him to continue wearing the wrist brace, as this seems to improve symptoms. Ok to take off for shower, otherwise on 24/7, until symptoms resolve.     Follow up in 6-8 weeks as needed     The information in this document,  created by a scribe for me, accurately reflects the services I personally performed and the decisions made by me. I have reviewed and approved this document for accuracy.      MALIA White MD  Dept. Orthopedic Surgery  [facility]       Again, thank you for allowing me to participate in the care of your patient.        Sincerely,        Vishal White MD

## 2019-02-12 ENCOUNTER — TELEPHONE (OUTPATIENT)
Dept: ORTHOPEDICS | Facility: CLINIC | Age: 24
End: 2019-02-12

## 2019-02-12 ENCOUNTER — TELEPHONE (OUTPATIENT)
Dept: URGENT CARE | Facility: URGENT CARE | Age: 24
End: 2019-02-12

## 2019-02-12 NOTE — TELEPHONE ENCOUNTER
Message left at # provided advising a wo rk note mailed to patient home address in [place]. See note for details Sean GALAVIZ

## 2019-02-12 NOTE — TELEPHONE ENCOUNTER
Patient LVM requesting an updated work note.   He states he is still unable to fully function with his wrist fracture and is continuing to have difficulties with daily activities.    Please call to discuss any restrictions/work information.    # [phone]      ELVIE

## 2019-02-12 NOTE — TELEPHONE ENCOUNTER
Patient states needs a doctors note for work as his wrist is still hurting.  When he try's to move it around.  Please advise.  Ok to leave a message.  Thank You.

## 2019-02-12 NOTE — TELEPHONE ENCOUNTER
The patient has seen orthopedic(s) and they are treating the problem so any recommendations should come from them.  Ran Rockwell MD

## 2019-02-12 NOTE — TELEPHONE ENCOUNTER
Provider:  Would you be willing to write another note as it looks like he reached out to ortho also? This has to go to primary care as they have no PCP and same day is out for the day.  Thank you.   Karma Pickett R.N.

## 2020-04-27 ENCOUNTER — VIRTUAL VISIT (OUTPATIENT)
Dept: FAMILY MEDICINE | Facility: CLINIC | Age: 25
End: 2020-04-27
Payer: COMMERCIAL

## 2020-04-27 DIAGNOSIS — F41.1 GAD (GENERALIZED ANXIETY DISORDER): Primary | ICD-10-CM

## 2020-04-27 DIAGNOSIS — F41.0 PANIC DISORDER: ICD-10-CM

## 2020-04-27 PROCEDURE — 99213 OFFICE O/P EST LOW 20 MIN: CPT | Mod: 95 | Performed by: FAMILY MEDICINE

## 2020-04-27 RX ORDER — ESCITALOPRAM OXALATE 10 MG/1
10 TABLET ORAL EVERY MORNING
Qty: 30 TABLET | Refills: 0 | Status: SHIPPED | OUTPATIENT
Start: 2020-04-27 | End: 2021-08-04

## 2020-04-27 ASSESSMENT — PATIENT HEALTH QUESTIONNAIRE - PHQ9: SUM OF ALL RESPONSES TO PHQ QUESTIONS 1-9: 11

## 2020-04-27 NOTE — PROGRESS NOTES
"Sivakumar Alonzo is a 25 year old male who is being evaluated via a billable telephone visit.      The patient has been notified of following:     \"This telephone visit will be conducted via a call between you and your physician/provider. We have found that certain health care needs can be provided without the need for a physical exam.  This service lets us provide the care you need with a short phone conversation.  If a prescription is necessary we can send it directly to your pharmacy.  If lab work is needed we can place an order for that and you can then stop by our lab to have the test done at a later time.    Telephone visits are billed at different rates depending on your insurance coverage. During this emergency period, for some insurers they may be billed the same as an in-person visit.  Please reach out to your insurance provider with any questions.    If during the course of the call the physician/provider feels a telephone visit is not appropriate, you will not be charged for this service.\"    Patient has given verbal consent for Telephone visit?  Yes    How would you like to obtain your AVS? Valentinohart    Subjective     Sivakumar Alonzo is a 25 year old male who presents to clinic today for the following health issues:    HPI  Abnormal Mood Symptoms  Onset: 1 month    Description:   Depression: YES  Anxiety: YES    Accompanying Signs & Symptoms:  Still participating in activities that you used to enjoy: no, maybe a littleFatigue: YES  Irritability: no   Difficulty concentrating: YES  Changes in appetite: no   Problems with sleep: no   Heart racing/beating fast : YES  Thoughts of hurting yourself or others: none    History:   Recent stress: YES... friend was murdered 1 month ago  Prior depression hospitalization: yes, 6years ago  Family history of depression: YES  Family history of anxiety: yes    Precipitating factors:   Alcohol/drug use: no     Alleviating factors:  none    Therapies Tried and outcome: None   "       Reviewed and updated as needed this visit by Provider         Review of Systems   ROS COMP:        Objective   Reported vitals:  There were no vitals taken for this visit.   healthy, alert and no distress  PSYCH: Alert and oriented times 3; coherent speech, normal   rate and volume, able to articulate logical thoughts, able   to abstract reason, no tangential thoughts, no hallucinations   or delusions  His affect is normal  RESP: No cough, no audible wheezing, able to talk in full sentences  Remainder of exam unable to be completed due to telephone visits    Diagnostic Test Results:  Labs reviewed in Epic        Assessment/Plan:  1. VERONICA (generalized anxiety disorder)    - escitalopram (LEXAPRO) 10 MG tablet; Take 1 tablet (10 mg) by mouth every morning  Dispense: 30 tablet; Refill: 0    2. Panic disorder    - escitalopram (LEXAPRO) 10 MG tablet; Take 1 tablet (10 mg) by mouth every morning  Dispense: 30 tablet; Refill: 0    No follow-ups on file.      Phone call duration:  12 minutes    Ran Rockwell MD  --------------------------------------------------------------------------------------------------------------------------------------  SUBJECTIVE:  Sivakumar Alonzo is a 25 year old male who presents for a follow up evaluation of possible anxiety. The patient reports that his current symptoms include anxiety symptoms with episodes of rapid heartbeat (palpitations) and shortness of breath and hyperventilation     He has been having headache(s)   His best friend passed about 4-5 weeks ago in a car accident .     His symptom(s) started soon after that  He takes no other medication other than Allegra.     He is single     He had a therapist in the past but he does not think it was not really helpful.     He is sleeping well.     He is working as a [employer]   He is able to work well.     He has had to leaved because he was nauseated and paniced.       He was on medications   prozac was only otitis media     No  "side effects                 VERONICA-7    Over the last 2 weeks, how often have you been bothered by the following problems?  (Use an \"x\" to indicate your answer) Not at all                (0) Several days                (1) More than half the days        (2) Nearly every day          (3)   1. Feeling nervous, anxious or on edge       2. Not being able to stop or control worrying       3. Worrying too much about different things       4. Trouble relaxing       5. Being so restless that it is hard to sit still       6. Becoming easily annoyed or irritable       7. Feeling afraid as if something awful might happen         Total_______    Cut points for:   Mild Anxiety =  5  Moderate= 10  Severe=  15    Last PHQ-9 score on record= No Value exists for the : HP#PHQ9    The patient reports that he has not attended mental health counseling for his current symptoms.  He does nothave a mental health appointment scheduled for the near future.    OBJECTIVE:  General: the patient had a calm and pleasanr affect during the visit today.    ASSESSMENT:Generalized Anxiety Disorder (VERONICA)  Panic Disorder      PLAN:  We will start the patient on Lexapro (escitalopram) 10  mg daily. I instructed the patient return to clinic for appointment in 2.5 weeks. A telephone visit is fine.    The patient was advised of the potential side effects of the medication and was asked to call if any of them persist past 7 days of starting it or starting on a new dose.    He was offered  a prescription for xanax but declined.         The patient was recommended to seek individual counseling through his insurance company as an adjunct treatment to the prescribed medications.          "

## 2020-04-27 NOTE — PROGRESS NOTES
"Sivakumar Alonzo is a 25 year old male who is being evaluated via a billable telephone visit.      The patient has been notified of following:     \"This telephone visit will be conducted via a call between you and your physician/provider. We have found that certain health care needs can be provided without the need for a physical exam.  This service lets us provide the care you need with a short phone conversation.  If a prescription is necessary we can send it directly to your pharmacy.  If lab work is needed we can place an order for that and you can then stop by our lab to have the test done at a later time.    Telephone visits are billed at different rates depending on your insurance coverage. During this emergency period, for some insurers they may be billed the same as an in-person visit.  Please reach out to your insurance provider with any questions.    If during the course of the call the physician/provider feels a telephone visit is not appropriate, you will not be charged for this service.\"    Patient has given verbal consent for Telephone visit?  {YES-NO  Default Yes:4444::\"Yes\"}    How would you like to obtain your AVS? {AVS Preference:003877}    Subjective     Sivakumar Alonzo is a 25 year old male who presents to clinic today for the following health issues:    HPI  {SUPERLIST (Optional):561833}  {PEDS Chronic and Acute Problems (Optional):472480}     {additonal problems for provider to add (Optional):158277}    {HIST REVIEW/ LINKS 2 (Optional):774262}    Reviewed and updated as needed this visit by Provider         Review of Systems   {ROS COMP (Optional):068567}       Objective   Reported vitals:  There were no vitals taken for this visit.   {GENERAL APPEARANCE:50::\"healthy\",\"alert\",\"no distress\"}  PSYCH: Alert and oriented times 3; coherent speech, normal   rate and volume, able to articulate logical thoughts, able   to abstract reason, no tangential thoughts, no hallucinations   or delusions  His affect " "is { :7441804::\"normal\"}  RESP: No cough, no audible wheezing, able to talk in full sentences  Remainder of exam unable to be completed due to telephone visits    {Diagnostic Test Results (Optional):462782::\"Diagnostic Test Results:\",\"Labs reviewed in Epic\"}        Assessment/Plan:  {Diagnosis, Associated Orders and Comment:185960}    No follow-ups on file.      Phone call duration:  *** minutes    {signature options:218724}        "

## 2020-10-23 ENCOUNTER — OFFICE VISIT (OUTPATIENT)
Dept: URGENT CARE | Facility: URGENT CARE | Age: 25
End: 2020-10-23
Payer: COMMERCIAL

## 2020-10-23 VITALS
SYSTOLIC BLOOD PRESSURE: 128 MMHG | TEMPERATURE: 98.1 F | DIASTOLIC BLOOD PRESSURE: 72 MMHG | OXYGEN SATURATION: 98 % | HEART RATE: 77 BPM

## 2020-10-23 PROCEDURE — 99213 OFFICE O/P EST LOW 20 MIN: CPT | Performed by: NURSE PRACTITIONER

## 2020-10-23 ASSESSMENT — ENCOUNTER SYMPTOMS
CONSTITUTIONAL NEGATIVE: 1
CARDIOVASCULAR NEGATIVE: 1
NEUROLOGICAL NEGATIVE: 1
RESPIRATORY NEGATIVE: 1
FALLS: 0

## 2020-10-23 NOTE — LETTER
[facility] [place]  [address]  [facility] [address]  Phone: [phone]    October 23, 2020        Sivakumar Alonzo  [address]  [facility] [address]          To whom it may concern:    RE: Sivakumar Alonzo    Patient was seen and treated today at our clinic. He has an ankle injury and   Will need to have a sitting position at work for for the next 48 hours. He can resume duties on Monday, October 26th without restriction      Please contact me for questions or concerns.      Sincerely,        CARLOS ALBERTO Jerez CNP

## 2020-10-23 NOTE — PROGRESS NOTES
"HPI  Sivakumar Alonzo 25 year old presents to the urgent care with chief complaint of left ankle pain.  He reports that yesterday while walking he stepped on an uneven surface and \"rolled\" his ankle.  He has had a slight bit of swelling and found that after standing out of all day at work it was increasingly uncomfortable.  After he injured he did elevate, ice, and took 1000 mg of p.o. Tylenol this morning.  Review of Systems   Constitutional: Negative.    Respiratory: Negative.    Cardiovascular: Negative.    Musculoskeletal: Positive for joint pain. Negative for falls.   Skin: Negative.    Neurological: Negative.        No past medical history on file.  Patient Active Problem List   Diagnosis     Asthma     Mixed anxiety depressive disorder     Hx borderline personality disorder (H)      No past surgical history on file.  Allergies   Allergen Reactions     Cefzil [Cefprozil] Hives     Codeine      Extremely sensitive to this drug - be careful with dose     Sulfa Drugs Swelling     Vancomycin      Amoxicillin Rash     Nystatin Rash     Current Outpatient Medications   Medication     fexofenadine (ALLEGRA) 180 MG tablet     escitalopram (LEXAPRO) 10 MG tablet     No current facility-administered medications for this visit.          Physical Exam  Vitals signs and nursing note reviewed.   Constitutional:       General: He is not in acute distress.     Comments: /72   Pulse 77   Temp 98.1  F (36.7  C) (Tympanic)   SpO2 98%      HENT:      Head: Normocephalic.   Cardiovascular:      Rate and Rhythm: Normal rate.   Pulmonary:      Effort: Pulmonary effort is normal.   Musculoskeletal:      Left ankle: He exhibits decreased range of motion. He exhibits no swelling, no ecchymosis and no deformity. Tenderness. Lateral malleolus tenderness found. Achilles tendon normal. Achilles tendon exhibits no pain, no defect and normal Laird's test results.      Comments: Ambulation observed and performed without limping or " difficulty.    Skin:     General: Skin is warm and dry.      Capillary Refill: Capillary refill takes less than 2 seconds.      Findings: No bruising.   Neurological:      Mental Status: He is alert and oriented to person, place, and time.       Assessment:  1. Grade 2 ankle sprain, left, initial encounter        Plan:  Orders Placed This Encounter     ELASTIC COMPRESSION BANDAGE   Wear ace when up  Work restriction for 48 hours  Ice and elevate as directed in AVS  Instructions regarding self-care of patient with sprain reviewed.   Written instructions provided in after visit summary and reviewed.  Patient instructed to see primary care provider for new or persistent symptoms.     Kaitlin Vargas, DNP, APRN, CNP

## 2020-10-23 NOTE — PATIENT INSTRUCTIONS
Patient Education     Treating Ankle Sprains  Treatment will depend on how bad your sprain is. For a severe sprain, healing may take 3 months or more.  Right after your injury: Use R.I.C.E.    BIG: Rest: At first, keep weight off the ankle as much as you can. You may be given crutches to help you walk without putting weight on the ankle.    BIG: Ice: Put an ice pack on the ankle for 20 minutes. Remove the pack and wait at least 30 minutes. Repeat for up to 3 days. This helps reduce swelling.    BIG: Compression: To reduce swelling and keep the joint stable, you may need to wrap the ankle with an elastic bandage. For more severe sprains, you may need an ankle brace, a boot, or a cast.    BIG: Elevation: To reduce swelling, keep your ankle raised above your heart when you sit or lie down.  Medicine  Your healthcare provider may suggest oral nonsteroidal anti-inflammatory medicine (NSAIDs), such as ibuprofen. This relieves the pain and helps reduce swelling. Be sure to take your medicine as directed.  Exercises    After about 2 to 3 weeks, you may be given exercises to strengthen the ligaments and muscles in the ankle. Doing these exercises will help prevent another ankle sprain. Exercises may include standing on your toes and then on your heels and doing ankle curls.    Sit on the edge of a sturdy table or lie on your back.    Pull your toes toward you. Then point them away from you. Repeat for 2 to 3 minutes.  Date Last Reviewed: 1/1/2018 2000-2019 [employer]. [address] [place] [address], [place] [address]. All rights reserved. This information is not intended as a substitute for professional medical care. Always follow your healthcare professional's instructions.

## 2021-07-21 ENCOUNTER — TELEPHONE (OUTPATIENT)
Dept: FAMILY MEDICINE | Facility: CLINIC | Age: 26
End: 2021-07-21

## 2021-07-21 ENCOUNTER — OFFICE VISIT (OUTPATIENT)
Dept: FAMILY MEDICINE | Facility: CLINIC | Age: 26
End: 2021-07-21
Payer: COMMERCIAL

## 2021-07-21 VITALS
SYSTOLIC BLOOD PRESSURE: 111 MMHG | DIASTOLIC BLOOD PRESSURE: 81 MMHG | RESPIRATION RATE: 18 BRPM | BODY MASS INDEX: 17.9 KG/M2 | HEART RATE: 87 BPM | HEIGHT: 72 IN | OXYGEN SATURATION: 97 % | TEMPERATURE: 98.2 F | WEIGHT: 132.2 LBS

## 2021-07-21 DIAGNOSIS — J02.9 SORE THROAT: ICD-10-CM

## 2021-07-21 DIAGNOSIS — J02.0 STREPTOCOCCAL PHARYNGITIS: Primary | ICD-10-CM

## 2021-07-21 DIAGNOSIS — J06.9 UPPER RESPIRATORY TRACT INFECTION, UNSPECIFIED TYPE: ICD-10-CM

## 2021-07-21 LAB
DEPRECATED S PYO AG THROAT QL EIA: POSITIVE
SARS-COV-2 RNA RESP QL NAA+PROBE: NEGATIVE

## 2021-07-21 PROCEDURE — U0003 INFECTIOUS AGENT DETECTION BY NUCLEIC ACID (DNA OR RNA); SEVERE ACUTE RESPIRATORY SYNDROME CORONAVIRUS 2 (SARS-COV-2) (CORONAVIRUS DISEASE [COVID-19]), AMPLIFIED PROBE TECHNIQUE, MAKING USE OF HIGH THROUGHPUT TECHNOLOGIES AS DESCRIBED BY CMS-2020-01-R: HCPCS | Performed by: PHYSICIAN ASSISTANT

## 2021-07-21 PROCEDURE — U0005 INFEC AGEN DETEC AMPLI PROBE: HCPCS | Performed by: PHYSICIAN ASSISTANT

## 2021-07-21 PROCEDURE — 99213 OFFICE O/P EST LOW 20 MIN: CPT | Performed by: PHYSICIAN ASSISTANT

## 2021-07-21 RX ORDER — AZITHROMYCIN 500 MG/1
500 TABLET, FILM COATED ORAL DAILY
Qty: 5 TABLET | Refills: 0 | Status: SHIPPED | OUTPATIENT
Start: 2021-07-21 | End: 2021-07-26

## 2021-07-21 ASSESSMENT — PAIN SCALES - GENERAL: PAINLEVEL: MILD PAIN (3)

## 2021-07-21 ASSESSMENT — MIFFLIN-ST. JEOR: SCORE: 1607.66

## 2021-07-21 NOTE — LETTER
[address] [address]  Phone: [phone]    July 21, 2021        Sivakumar Alonzo  [address] [place] [address]          To whom it may concern:    RE: Sivakumar Alonzo    Patient was seen and treated today at our clinic and missed work.    Please contact me for questions or concerns.      Sincerely,        Shwetha Mercado PA-C

## 2021-07-21 NOTE — TELEPHONE ENCOUNTER
Strep was positive, I have sent prescription for Azithromycin to his pharmacy. There is a detailed note in results as well  Thank you     Shwetha Mercado PA-C

## 2021-07-21 NOTE — PROGRESS NOTES
Assessment & Plan   Problem List Items Addressed This Visit     None      Visit Diagnoses     Streptococcal pharyngitis    -  Primary    Sore throat        Relevant Orders    Streptococcus A Rapid Scr w Reflx to PCR - Lab Collect (Completed)    Symptomatic COVID-19 Virus (Coronavirus) by PCR Nasopharyngeal (Completed)    Upper respiratory tract infection, unspecified type        Relevant Orders    Streptococcus A Rapid Scr w Reflx to PCR - Lab Collect (Completed)    Symptomatic COVID-19 Virus (Coronavirus) by PCR Nasopharyngeal (Completed)       Amoxicillin 500 mg twice a day for 10 days   Mucinex DM 1 tablet twice a day for 10 days   Gurgle with salt water  Cepacol lozenges   Ibuprofen 600 mg every 6 hours as needed for pain of sore throat or headache     20 minutes spent on the date of the encounter doing chart review, history and exam, documentation and further activities per the note       Return in about 1 week (around 7/28/2021), or if symptoms worsen or fail to improve.    [facility]DA [facility] is a 26 year old who presents for the following health issues     HPI     Concern -  Swollen tonsils   Onset: 7/19/2021   Description: Patient states he has swollen tonsils, sore throat   Intensity: moderate, severe, 3/10  Progression of Symptoms:  worsening and constant  Accompanying Signs & Symptoms: cough, congestion, and runny nose   Previous history of similar problem: yes  Precipitating factors:        Worsened by: talking too much, not drinking enough water to help the throat  Alleviating factors:        Improved by: cough drops, water, ice cream   Therapies tried and outcome: cough drops, water, and ice cream         Review of Systems   Constitutional, HEENT, cardiovascular, pulmonary, gi and gu systems are negative, except as otherwise noted.      Objective    /81 (BP Location: Right arm, Patient Position: Sitting, Cuff Size: Adult  Regular)   Pulse 87   Temp 98.2  F (36.8  C) (Oral)   Resp 18   Ht 1.829 m (6')   Wt 60 kg (132 lb 3.2 oz)   SpO2 97%   BMI 17.93 kg/m    Body mass index is 17.93 kg/m .  Physical Exam   GENERAL: healthy, alert and no distress  EYES: Eyes grossly normal to inspection, PERRL and conjunctivae and sclerae normal  HENT: normal cephalic/atraumatic, ear canals and TM's normal, nose and mouth without ulcers or lesions, oropharynx clear, mild tonsillar pillar erythema without hypertrophy or exudates and oral mucous membranes moist  NECK: no adenopathy, no asymmetry, masses, or scars and thyroid normal to palpation  RESP: lungs clear to auscultation - no rales, rhonchi or wheezes  CV: regular rate and rhythm, normal S1 S2, no S3 or S4, no murmur, click or rub, no peripheral edema and peripheral pulses strong  ABDOMEN: soft, nontender, no hepatosplenomegaly, no masses and bowel sounds normal  MS: no gross musculoskeletal defects noted, no edema        Diagnostic Test Results:  Results for orders placed or performed in visit on 07/21/21   SARS-COV2 (COVID-19) Virus RT-PCR     Status: Normal    Specimen: Nasopharyngeal; Swab   Result Value Ref Range    SARS CoV2 PCR Negative Negative    Narrative    Testing was performed using the Aptima SARS-CoV-2 Assay on the  [employer]nex[employer] Instrument System. Additional information about this  Emergency Use Authorization (EUA) assay can be found via the Lab  Guide. This test should be ordered for the detection of SARS-CoV-2 in  individuals who meet SARS-CoV-2 clinical and/or epidemiological  criteria. Test performance is unknown in asymptomatic patients. This  test is for in vitro diagnostic use under the FDA EUA for  laboratories certified under CLIA to perform high complexity testing.  This test has not been FDA cleared or approved. A negative result  does not rule out the presence of PCR inhibitors in the specimen or  target RNA in concentration below the limit of detection for  the  assay. The possibility of a false negative should be considered if  the patient's recent exposure or clinical presentation suggests  COVID-19. This test was validated by the [facility]. This laboratory is certified under  the Clinical Laboratory Improvement Amendments of 1988 (CLIA-88) as  qualified to perform high complexity laboratory testing.   Streptococcus A Rapid Scr w Reflx to PCR - Lab Collect     Status: Abnormal    Specimen: Throat; Swab   Result Value Ref Range    Group A Strep antigen Positive (A) Negative   Symptomatic COVID-19 Virus (Coronavirus) by PCR Nasopharyngeal     Status: Normal    Specimen: Nasopharyngeal; Swab    Narrative    The following orders were created for panel order Symptomatic COVID-19 Virus (Coronavirus) by PCR Nasopharyngeal.  Procedure                               Abnormality         Status                     ---------                               -----------         ------                     SARS-COV2 (COVID-19) Vir...[[id number]]  Normal              Final result                 Please view results for these tests on the individual orders.

## 2021-07-21 NOTE — TELEPHONE ENCOUNTER
Called and spoke with patient.  Advised of results and discussed treatment given.  Instructed on how to take antibiotic. Home care advise was also discussed: increase fluid intake, rest, okay to take Tylenol or IBU for any throat discomfort, fever. Okay to use throat lozenges, popsicles, cooling or soft foods, what can tolerate regarding swallowing and throat symptoms. Wash hands often, clean surfaces at home if does not live alone. Contagious for first 24 hours on antibiotics. Take full course of treatment.    Patient voiced understanding and agreed with all plans.    Patient did attempt to log in to [employer] page but could not gain access. He will try again.      Hilary Wright RN  [facility]

## 2021-07-21 NOTE — PATIENT INSTRUCTIONS
Mucinex DM 1 tablet twice a day for 10 days   Gurgle with salt water  Cepacol lozenges   Ibuprofen 600 mg every 6 hours as needed for pain of sore throat or headache

## 2021-07-21 NOTE — TELEPHONE ENCOUNTER
Patient calling to check on strep result.  Please call patient back, can leave a message.  Raquel Fernandez [facility]  2nd Floor  Primary Care

## 2021-07-22 ASSESSMENT — ASTHMA QUESTIONNAIRES: ACT_TOTALSCORE: 22

## 2021-07-25 ENCOUNTER — HEALTH MAINTENANCE LETTER (OUTPATIENT)
Age: 26
End: 2021-07-25

## 2021-08-04 ENCOUNTER — VIRTUAL VISIT (OUTPATIENT)
Dept: FAMILY MEDICINE | Facility: CLINIC | Age: 26
End: 2021-08-04
Payer: COMMERCIAL

## 2021-08-04 DIAGNOSIS — F41.0 PANIC ATTACK: Primary | ICD-10-CM

## 2021-08-04 PROCEDURE — 99214 OFFICE O/P EST MOD 30 MIN: CPT | Mod: 95 | Performed by: NURSE PRACTITIONER

## 2021-08-04 RX ORDER — HYDROXYZINE HYDROCHLORIDE 25 MG/1
25 TABLET, FILM COATED ORAL 2 TIMES DAILY PRN
Qty: 30 TABLET | Refills: 1 | Status: SHIPPED | OUTPATIENT
Start: 2021-08-04 | End: 2022-04-22

## 2021-08-04 NOTE — PATIENT INSTRUCTIONS
If you are in crisis, you can call the Crisis Connection Hotline 24/7 at [phone]  [facility] open 24/7 for anyone in crisis for assessment, evaluation and care: [phone]  If you are thinking of hurting yourself or someone else, you can also go to the emergency department or call 911   Patient Education     Panic Attack  A panic attack is an extreme fear reaction that comes on for no clear reason. There is often a fear that something terrible will happen or that you may die. The attack may last a few minutes up to a few hours. Between attacks, things will seem quite normal. This condition has a psychological cause and can be treated with the help of a therapist or psychiatrist. Medicine can be very helpful for this problem.  Panic attacks usually come on suddenly, reaches a peak within minutes, and includes at least 4 of these symptoms:    Palpitations, pounding heart, or accelerated heart rate    Sweating    Chills or heat sensations    Trembling or shaking    Sensations of shortness of breath or smothering    Feelings of choking    Chest pain or discomfort    Nausea or abdominal distress    Feeling dizzy, unsteady, light-headed, or faint    Numbness or tingling sensations    Fear of dying    Fear of going crazy or of losing control    Feelings of unreality, strangeness, or detachment from the environment  Many of these symptoms can be linked to physical problems, so it is sometimes necessary to rule out conditions like thyroid disorders, heart disease, gastrointestinal problems, and others. They can also start as physical symptoms, but psychologically we may react to them in a fearful way, worsening the way we react and feel.  Home care    Try to find the sources of stress in your life. They may not be obvious. These may include:  ? Daily hassles of life which pile up (traffic jams, missed appointments, car troubles).  ? Major life changes, both good (new baby, job  promotion) and bad (loss of job, loss of loved one).  ? Feeling that you have too many responsibilities and can't take care of everything at once.  ? Helplessness: feeling like your problems are too much for you to handle.    Notice how your body reacts to stress. Learn to listen to your body signals so that you can take action before the stress becomes severe.    Try to be aware of what you were doing before the reaction started; this may give you clues to things that can trigger a reaction. It may be situations in your life, or what you were doing at the time.    When possible, avoid or reduce the cause of stress. Avoid hassles, limit the amount of change that is happening in your life at one time or take a break when you feel overloaded.    Unfortunately, you can't stay away from many stressful situations. So you need to learn how to manage stress better. Many proven methods will reduce your anxiety. These include simple things like exercise, good nutrition, and adequate rest. Also, there are certain techniques that are helpful: relaxation and breathing exercises, visualization, biofeedback, meditation, or simply taking time-out to clear your mind. For more information about this, ask your doctor or go to a local bookstore and review the many books and tapes available on this subject.  Follow-up care  Follow-up with your healthcare provider, or as advised.  Call 911  Call 911 if you:    Have suicidal thoughts, a suicide plan, and the means to carry out the plan    Have serious thoughts of hurting someone else    Have trouble breathing    Are very confused    Feel very drowsy or have trouble awakening    Faint or lose consciousness    Have new chest pain that becomes more severe, lasts longer, or spreads into your shoulder, arm, neck, jaw, or back    Have a very rapid or irregular heartbeat    Have a seizure  When to seek medical advice  Call your healthcare provider right away if any of these occur:    Worsening  of your symptoms to the point of feeling out-of-control    Feeling that you may try to harm yourself or another    Can't sleep or eat for 3 days in a row    Increased pain with breathing    Increasing feeling of weakness or dizziness    Cough with dark colored sputum (phlegm) or blood    Fever of 100.4 F (38 C) or higher, or as directed by your healthcare provider    Swelling, pain, or redness in one leg    Requests by family or friends for you to seek help for your symptoms  Veronica last reviewed this educational content on 3/1/2018    7216-7150 [employer]. All rights reserved. This information is not intended as a substitute for professional medical care. Always follow your healthcare professional's instructions.

## 2021-08-04 NOTE — PROGRESS NOTES
Sivakumar is a 26 year old who is being evaluated via a billable telephone visit.      What phone number would you like to be contacted at? [phone]  How would you like to obtain your AVS? Dea    Assessment & Plan     Panic attack  I did offer clinic visit which he declined.  This is similar to previous panic attacks he has had before.  He declines daily controller medication such as restarting Lexapro.  Will trial hydroxyzine below.  Declines therapy.  Follow-up if worsening or not improving.  UC/ED precautions discussed.  - hydrOXYzine (ATARAX) 25 MG tablet; Take 1 tablet (25 mg) by mouth 2 times daily as needed for anxiety         See Patient Instructions    Return in about 1 week (around 8/11/2021), or if symptoms worsen or fail to improve.     The benefits, risks and potential side effects were discussed in detail. Black box warnings discussed as relevant. All patient questions were answered. The patient was instructed to follow up immediately if any adverse reactions develop.    Return precautions discussed, including when to seek urgent/emergent care.    Patient verbalizes understanding and agrees with plan of care. Patient stable for discharge.      CARLOS ALBERTO Arredondo [facility]    Isela Shaver is a 26 year old who presents for the following health issues     HPI       Pleasant 26-year-old male initiated virtual visit to discuss a panic attack he had yesterday while working.  He reports that he felt a twinge of chest pain and made a joke about it with his coworkers and then noticed he was having worsening anxiety.  He states EMS came and did an EKG, checked his blood sugar and did his vitals.  Both Sivakumar the EMS personnel felt his symptoms were from a panic attack.:  Has a history of panic attacks and states they have been occurring more frequently.  He had one yesterday and one about a month ago.  When he has a panic attack his face will feel numb, his hands  will feel numb, he gets chest pain, shaky and feels hot and sweaty.  He was previously on Lexapro and has not taken it in several months.  He has done therapy in the past and does not feel it is for him.  His request today is for a as needed medication for panic attack.      Review of Systems   Constitutional, HEENT, cardiovascular, pulmonary, gi and gu systems are negative, except as otherwise noted.      Objective           Vitals:  No vitals were obtained today due to virtual visit.    Physical Exam   healthy, alert and no distress  PSYCH: Alert and oriented times 3; coherent speech, normal   rate and volume, able to articulate logical thoughts, able   to abstract reason, no tangential thoughts, no hallucinations   or delusions  His affect is normal  RESP: No cough, no audible wheezing, able to talk in full sentences  Remainder of exam unable to be completed due to telephone visits                Phone call duration: 7 minutes

## 2021-08-04 NOTE — LETTER
August 4, 2021      Sivakumar Alonzo  [address] [place] [id number]        To Whom It May Concern:    Sivakumar Alonzo had a visit on 8/4/2021. He may return to work without restrictions.      Sincerely,        CARLOS ALBERTO Arredondo CNP

## 2021-09-19 ENCOUNTER — HEALTH MAINTENANCE LETTER (OUTPATIENT)
Age: 26
End: 2021-09-19

## 2021-10-18 ENCOUNTER — APPOINTMENT (OUTPATIENT)
Dept: URGENT CARE | Facility: CLINIC | Age: 26
End: 2021-10-18
Payer: COMMERCIAL

## 2021-11-01 ENCOUNTER — ANCILLARY PROCEDURE (OUTPATIENT)
Dept: GENERAL RADIOLOGY | Facility: CLINIC | Age: 26
End: 2021-11-01
Attending: PHYSICIAN ASSISTANT
Payer: COMMERCIAL

## 2021-11-01 ENCOUNTER — OFFICE VISIT (OUTPATIENT)
Dept: FAMILY MEDICINE | Facility: CLINIC | Age: 26
End: 2021-11-01
Payer: COMMERCIAL

## 2021-11-01 VITALS
DIASTOLIC BLOOD PRESSURE: 82 MMHG | TEMPERATURE: 98.2 F | HEART RATE: 86 BPM | SYSTOLIC BLOOD PRESSURE: 118 MMHG | BODY MASS INDEX: 18.17 KG/M2 | OXYGEN SATURATION: 99 % | WEIGHT: 134 LBS

## 2021-11-01 DIAGNOSIS — M79.644 PAIN OF RIGHT THUMB: Primary | ICD-10-CM

## 2021-11-01 DIAGNOSIS — M79.644 PAIN OF RIGHT THUMB: ICD-10-CM

## 2021-11-01 PROCEDURE — 99213 OFFICE O/P EST LOW 20 MIN: CPT | Performed by: PHYSICIAN ASSISTANT

## 2021-11-01 PROCEDURE — 73140 X-RAY EXAM OF FINGER(S): CPT | Mod: RT | Performed by: RADIOLOGY

## 2021-11-01 ASSESSMENT — PAIN SCALES - GENERAL: PAINLEVEL: NO PAIN (0)

## 2021-11-01 NOTE — PROGRESS NOTES
Assessment & Plan     Pain of right thumb  Xray reviewed and normal appearing.   Referral placed for consultation.   - XR Finger Right G/E 2 Views; Future  - Orthopedic  Referral; Future                 Return in about 2 years (around 11/1/2023) for follow instructions given with Orthopedic scheduling.    Kristen M. Kehr, PA-C [facility] MONCHO Shaver is a 26 year old who presents for the following health issues     HPI     Concern - right thumb  Onset: ongoing for 3 months, no known injury. He is left hand dominant.   Description: sometime sharp pain, pressure and pain when moving right thumb, mainly with extension of the thumb. There is no swelling of the joints. Pain is typically at 2/10, but can be a 5/10 at times depending on activity.   Intensity: varies with movements  Progression of Symptoms:  varies  Accompanying Signs & Symptoms:   Previous history of similar problem:   Precipitating factors:        Worsened by: movements  Alleviating factors:        Improved by:   Therapies tried and outcome:         Review of Systems   Constitutional, HEENT, cardiovascular, pulmonary, GI, , musculoskeletal, neuro, skin, endocrine and psych systems are negative, except as otherwise noted.      Objective    /82   Pulse 86   Temp 98.2  F (36.8  C) (Tympanic)   Wt 60.8 kg (134 lb)   SpO2 99%   BMI 18.17 kg/m    Body mass index is 18.17 kg/m .  Physical Exam   GENERAL: healthy, alert and no distress  MS: right hand: normal inspection. There is no swelling of joints, all joints in thumb are normal appearing, tender over the MCP joint, no deformities palpable. Normal ROM of all joints in thumb and fingers.   SKIN: no suspicious lesions or rashes  PSYCH: mentation appears normal, affect normal/bright    Xray reviewed: few sesamoid bones present / normal exam

## 2021-11-01 NOTE — NURSING NOTE
Chief Complaint   Patient presents with     Thumb Discomfort     right       Initial /82   Pulse 86   Temp 98.2  F (36.8  C) (Tympanic)   Wt 60.8 kg (134 lb)   SpO2 99%   BMI 18.17 kg/m   Estimated body mass index is 18.17 kg/m  as calculated from the following:    Height as of 7/21/21: 1.829 m (6').    Weight as of this encounter: 60.8 kg (134 lb).  Medication Reconciliation: elenita Ramos MA

## 2022-01-04 ENCOUNTER — MYC MEDICAL ADVICE (OUTPATIENT)
Dept: FAMILY MEDICINE | Facility: CLINIC | Age: 27
End: 2022-01-04
Payer: COMMERCIAL

## 2022-01-06 ENCOUNTER — OFFICE VISIT (OUTPATIENT)
Dept: URGENT CARE | Facility: URGENT CARE | Age: 27
End: 2022-01-06
Payer: COMMERCIAL

## 2022-01-06 ENCOUNTER — ANCILLARY PROCEDURE (OUTPATIENT)
Dept: GENERAL RADIOLOGY | Facility: CLINIC | Age: 27
End: 2022-01-06
Attending: NURSE PRACTITIONER
Payer: COMMERCIAL

## 2022-01-06 VITALS
OXYGEN SATURATION: 98 % | HEART RATE: 69 BPM | DIASTOLIC BLOOD PRESSURE: 77 MMHG | BODY MASS INDEX: 18.74 KG/M2 | WEIGHT: 138.2 LBS | TEMPERATURE: 98.2 F | SYSTOLIC BLOOD PRESSURE: 122 MMHG

## 2022-01-06 DIAGNOSIS — R07.9 ACUTE CHEST PAIN: Primary | ICD-10-CM

## 2022-01-06 DIAGNOSIS — R07.81 PLEURITIC CHEST PAIN: ICD-10-CM

## 2022-01-06 PROCEDURE — 99214 OFFICE O/P EST MOD 30 MIN: CPT | Performed by: NURSE PRACTITIONER

## 2022-01-06 PROCEDURE — 71046 X-RAY EXAM CHEST 2 VIEWS: CPT | Performed by: RADIOLOGY

## 2022-01-06 PROCEDURE — 93000 ELECTROCARDIOGRAM COMPLETE: CPT | Performed by: NURSE PRACTITIONER

## 2022-01-06 NOTE — PROGRESS NOTES
SUBJECTIVE:   Sivakumar Alonzo is a 26 year old male presenting with a chief complaint of   Chief Complaint   Patient presents with     Chest Pain     occasional sharp chest pain 3 months ago, took a deep breath at work cause him to collapsed       He is an established patient of [facility].    SUBJECTIVE  Sivakumar Alonzo is  presenting to Urgent Care with complains of chest pain on the left side of chest. The symptoms started 3 months ago. He describes pain has sharp. The symptoms have been coming daily with each episode lasting 3 seconds to 10 minutes. He reports pain when he takes a breath when it occurs.  He had chest pain at work 2 weeks ago which caused him to collapse and he called EMS.  He was evaluated but did not go to the ER.  He has not used any medications.  He admits history of anxiety and asthma.  He denies history of heart disease in his family.    Review of Systems   Cardiovascular: Positive for chest pain.   All other systems reviewed and are negative.      Past Medical History:   Diagnosis Date     Depressive disorder Unknown     Uncomplicated asthma Forever     No family history on file.  Current Outpatient Medications   Medication Sig Dispense Refill     fexofenadine (ALLEGRA) 180 MG tablet Take 180 mg by mouth       hydrOXYzine (ATARAX) 25 MG tablet Take 1 tablet (25 mg) by mouth 2 times daily as needed for anxiety 30 tablet 1     Social History     Tobacco Use     Smoking status: Never Smoker     Smokeless tobacco: Never Used   Substance Use Topics     Alcohol use: No       OBJECTIVE  /77 (BP Location: Left arm, Patient Position: Sitting, Cuff Size: Adult Small)   Pulse 69   Temp 98.2  F (36.8  C) (Axillary)   Wt 62.7 kg (138 lb 3.2 oz)   SpO2 98%   BMI 18.74 kg/m      Physical Exam  Cardiovascular:      Rate and Rhythm: Normal rate and regular rhythm.      Pulses: Normal pulses.   Pulmonary:      Effort: Pulmonary effort is normal.      Breath sounds: Normal breath sounds.    Musculoskeletal:         General: Normal range of motion.   Neurological:      General: No focal deficit present.   Psychiatric:         Mood and Affect: Mood normal.         Labs:  Results for orders placed or performed in visit on 01/06/22 (from the past 24 hour(s))   XR Chest 2 Views    Narrative    EXAM: XR CHEST 2 VW  LOCATION: [facility]  DATE/TIME: 1/6/2022 5:26 PM    INDICATION:  Acute chest pain, Pleuritic chest pain  COMPARISON: Chest x-ray 01/02/2019      Impression    IMPRESSION: Negative chest.  The lungs are clear and there are no pleural effusions. Normal heart size.           ASSESSMENT:      ICD-10-CM    1. Acute chest pain  R07.9 EKG 12-lead complete w/read - Clinics     XR Chest 2 Views   2. Pleuritic chest pain  R07.81 XR Chest 2 Views        Medical Decision Making:    Differential Diagnosis:  Cardiac: Acute MI  Pleurisy  GERD  Pericarditis  Esophageal spasms    PLAN:  I have reviewed the chest x-ray and EKG with the patient.  Chest pain is not reproducible with stretching or putting pressure on the chest.  Decision is made to send patient to the ER.  He has declined.  Chest pain has been stable,  he has had it for 3 months.  I will send patient to [facility] for further evaluation and management.  He will go to [facility] in [place] tomorrow at 9 in the morning.

## 2022-01-07 ENCOUNTER — OFFICE VISIT (OUTPATIENT)
Dept: PEDIATRICS | Facility: CLINIC | Age: 27
End: 2022-01-07
Payer: COMMERCIAL

## 2022-01-07 ENCOUNTER — ANCILLARY PROCEDURE (OUTPATIENT)
Dept: CT IMAGING | Facility: CLINIC | Age: 27
End: 2022-01-07
Attending: FAMILY MEDICINE
Payer: COMMERCIAL

## 2022-01-07 VITALS
RESPIRATION RATE: 16 BRPM | DIASTOLIC BLOOD PRESSURE: 71 MMHG | SYSTOLIC BLOOD PRESSURE: 116 MMHG | OXYGEN SATURATION: 100 % | TEMPERATURE: 97.4 F | HEART RATE: 62 BPM

## 2022-01-07 DIAGNOSIS — R07.81 PLEURITIC CHEST PAIN: ICD-10-CM

## 2022-01-07 DIAGNOSIS — R07.9 ACUTE CHEST PAIN: ICD-10-CM

## 2022-01-07 DIAGNOSIS — D69.6 THROMBOCYTOPENIA (H): ICD-10-CM

## 2022-01-07 DIAGNOSIS — Z82.49 FAMILY HISTORY OF BLOOD CLOTS: ICD-10-CM

## 2022-01-07 DIAGNOSIS — R07.89 ATYPICAL CHEST PAIN: ICD-10-CM

## 2022-01-07 DIAGNOSIS — R07.89 ATYPICAL CHEST PAIN: Primary | ICD-10-CM

## 2022-01-07 LAB
ALBUMIN SERPL-MCNC: 4.2 G/DL (ref 3.4–5)
ALP SERPL-CCNC: 45 U/L (ref 40–150)
ALT SERPL W P-5'-P-CCNC: 23 U/L (ref 0–70)
ANION GAP SERPL CALCULATED.3IONS-SCNC: 4 MMOL/L (ref 3–14)
AST SERPL W P-5'-P-CCNC: 19 U/L (ref 0–45)
BASOPHILS # BLD AUTO: 0.1 10E3/UL (ref 0–0.2)
BASOPHILS NFR BLD AUTO: 1 %
BILIRUB SERPL-MCNC: 0.6 MG/DL (ref 0.2–1.3)
BUN SERPL-MCNC: 14 MG/DL (ref 7–30)
CALCIUM SERPL-MCNC: 9.4 MG/DL (ref 8.5–10.1)
CHLORIDE BLD-SCNC: 111 MMOL/L (ref 94–109)
CO2 SERPL-SCNC: 27 MMOL/L (ref 20–32)
CREAT SERPL-MCNC: 0.89 MG/DL (ref 0.66–1.25)
CRP SERPL-MCNC: <2.9 MG/L (ref 0–8)
D DIMER PPP FEU-MCNC: <0.27 UG/ML FEU (ref 0–0.5)
EOSINOPHIL # BLD AUTO: 0.2 10E3/UL (ref 0–0.7)
EOSINOPHIL NFR BLD AUTO: 4 %
ERYTHROCYTE [DISTWIDTH] IN BLOOD BY AUTOMATED COUNT: 12.7 % (ref 10–15)
ERYTHROCYTE [SEDIMENTATION RATE] IN BLOOD BY WESTERGREN METHOD: 1 MM/HR (ref 0–15)
GFR SERPL CREATININE-BSD FRML MDRD: >90 ML/MIN/1.73M2
GLUCOSE BLD-MCNC: 57 MG/DL (ref 70–99)
HCT VFR BLD AUTO: 41.9 % (ref 40–53)
HGB BLD-MCNC: 14.3 G/DL (ref 13.3–17.7)
IMM GRANULOCYTES # BLD: 0 10E3/UL
IMM GRANULOCYTES NFR BLD: 0 %
LYMPHOCYTES # BLD AUTO: 1.3 10E3/UL (ref 0.8–5.3)
LYMPHOCYTES NFR BLD AUTO: 27 %
MCH RBC QN AUTO: 31.3 PG (ref 26.5–33)
MCHC RBC AUTO-ENTMCNC: 34.1 G/DL (ref 31.5–36.5)
MCV RBC AUTO: 92 FL (ref 78–100)
MONOCYTES # BLD AUTO: 0.5 10E3/UL (ref 0–1.3)
MONOCYTES NFR BLD AUTO: 11 %
NEUTROPHILS # BLD AUTO: 2.8 10E3/UL (ref 1.6–8.3)
NEUTROPHILS NFR BLD AUTO: 57 %
NRBC # BLD AUTO: 0 10E3/UL
NRBC BLD AUTO-RTO: 0 /100
PLATELET # BLD AUTO: 136 10E3/UL (ref 150–450)
POTASSIUM BLD-SCNC: 3.9 MMOL/L (ref 3.4–5.3)
PROT SERPL-MCNC: 7.1 G/DL (ref 6.8–8.8)
RBC # BLD AUTO: 4.57 10E6/UL (ref 4.4–5.9)
SODIUM SERPL-SCNC: 142 MMOL/L (ref 133–144)
TROPONIN I SERPL HS-MCNC: 6 NG/L
WBC # BLD AUTO: 4.9 10E3/UL (ref 4–11)

## 2022-01-07 PROCEDURE — 85379 FIBRIN DEGRADATION QUANT: CPT | Performed by: FAMILY MEDICINE

## 2022-01-07 PROCEDURE — 85025 COMPLETE CBC W/AUTO DIFF WBC: CPT | Performed by: FAMILY MEDICINE

## 2022-01-07 PROCEDURE — 84484 ASSAY OF TROPONIN QUANT: CPT | Performed by: FAMILY MEDICINE

## 2022-01-07 PROCEDURE — 71275 CT ANGIOGRAPHY CHEST: CPT | Mod: GC | Performed by: RADIOLOGY

## 2022-01-07 PROCEDURE — 86140 C-REACTIVE PROTEIN: CPT | Performed by: FAMILY MEDICINE

## 2022-01-07 PROCEDURE — 36415 COLL VENOUS BLD VENIPUNCTURE: CPT | Performed by: FAMILY MEDICINE

## 2022-01-07 PROCEDURE — 93000 ELECTROCARDIOGRAM COMPLETE: CPT | Performed by: FAMILY MEDICINE

## 2022-01-07 PROCEDURE — 80053 COMPREHEN METABOLIC PANEL: CPT | Performed by: FAMILY MEDICINE

## 2022-01-07 PROCEDURE — 99215 OFFICE O/P EST HI 40 MIN: CPT | Performed by: FAMILY MEDICINE

## 2022-01-07 PROCEDURE — 85652 RBC SED RATE AUTOMATED: CPT | Performed by: FAMILY MEDICINE

## 2022-01-07 RX ORDER — IOPAMIDOL 755 MG/ML
58 INJECTION, SOLUTION INTRAVASCULAR ONCE
Status: COMPLETED | OUTPATIENT
Start: 2022-01-07 | End: 2022-01-07

## 2022-01-07 RX ADMIN — IOPAMIDOL 58 ML: 755 INJECTION, SOLUTION INTRAVASCULAR at 11:05

## 2022-01-07 ASSESSMENT — PAIN SCALES - GENERAL: PAINLEVEL: NO PAIN (0)

## 2022-01-07 NOTE — PATIENT INSTRUCTIONS
Patient Education     Uncertain Causes of Chest Pain    Chest pain can happen for a number of reasons. Sometimes the cause can't be determined. If your condition does not seem serious, and your pain does not appear to be coming from your heart, your healthcare provider may recommend watching it closely. Sometimes the signs of a serious problem take more time to appear. Many problems not related to your heart can cause chest pain. These include:    Musculoskeletal. Costochondritis is an inflammation of the tissues around the ribs that can occur from trauma or overuse injuries, or a strain of the muscles of the chest wall    Respiratory. Pneumonia, collapsed lung (pneumothorax), or inflammation of the lining of the chest and lungs (pleurisy)    Gastrointestinal. Esophageal reflux, heartburn, ulcers, or gallbladder disease    Anxiety and panic disorders    Nerve compression and inflammation    Rare miscellaneous problems such as aortic aneurysm (a swelling of the large artery coming out of the heart) or pulmonary embolism (a blood clot in the lungs)  Home care  After your visit, follow these recommendations:    Rest today and avoid strenuous activity.    Take any prescribed medicine as directed.    Be aware of any recurrent chest pain and notice any changes  Follow-up care  Follow up with your healthcare provider if you do not start to feel better within 24 hours, or as advised.  Call 911  Call 911 if any of these occur:    A change in the type of pain: if it feels different, becomes more severe, lasts longer, or begins to spread into your shoulder, arm, neck, jaw or back    Shortness of breath or increased pain with breathing    Weakness, dizziness, or fainting    Rapid heart beat    Crushing sensation in your chest  When to seek medical advice  Call your healthcare provider right away if any of the following occur:    Cough with dark colored sputum (phlegm) or blood    Fever of 100.4 F (38 C) or higher, or as  directed by your healthcare provider    Swelling, pain or redness in one leg  Veronica last reviewed this educational content on 5/1/2018 2000-2021 [employer]. All rights reserved. This information is not intended as a substitute for professional medical care. Always follow your healthcare professional's instructions.

## 2022-01-07 NOTE — PROGRESS NOTES
Pt given denia crackers and apple juice due to low blood sugar of 57 at 10:00am.     RN rechecked BG at 10:30am, BG was 114.      Shannan Dobbins RN

## 2022-01-07 NOTE — LETTER
[address] [address]  Phone: [phone]  Fax: [phone]    January 7, 2022        Sivakumar Alonzo  [address] [place] [id number]          To whom it may concern:    RE: Sivakumar Alonzo    Patient was seen and treated today at our clinic.  Please excuse from work today     Please contact me for questions or concerns.      Sincerely,        Bonnie Dougherty MD

## 2022-01-07 NOTE — PROGRESS NOTES
"    Isela Shaver is a 26 year old who presents for the following health issues chest pain    HPI Pt reporting chest pain that started 3 months ago and happens once a week, it's intermittent pain. Pt had it happen yesterday but not today - no pain today. Pt reports that when it happens, it lasts from 10 seconds to half an hour. Pt reports trying to take shallow breaths, pain is worse with deep breathing. Pt states that when having chest pain, the pain is about an 8/10 - Pt reports no pain today.     Chest Pain  Onset/Duration: Has been going on for 3 months  Description:   Location: left side  Character: sharp  Radiation: no radiation  Duration: 10 seconds to 30 minutes.   Intensity: 8/10, varies in severity.   Progression of Symptoms: same  Accompanying Signs & Symptoms:  Shortness of breath: no  Sweating: no  Nausea/vomiting: no  Lightheadedness: no  Palpitations: YES, \"get it all the time\"  Fever/Chills: no  Cough: no           Heartburn: no  History:   Family history of heart disease: mom has heartburn  Tobacco use: no  Previous similar symptoms: no   Precipitating factors:   Worse with exertion: no  Worse with deep breaths: YES, bigger breaths cause sharper pain.           Related to eating: no           Better with burping: no  Alleviating factors: none  Therapies tried and outcome: Nothing tried, happens at rest too. \"try to just take shallow breaths because when it happens its worse with deep breaths\"    Non smoker  No family history of early CAD    Dad had a blood clot - not sure what kind ? 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 He plans to watch. He says was just a slight bump.    Chest pain just one spot about a 5cm circumference area    First noticed 3 months ago was at work and was laughing walking around laughing then had a sudden pain couldn't breathe and had sharp pain everytie he took a breath in  He started panicking   EMS was called EKG done not sure what the results were. Patient declined going to the ER  Patient continued working and nothing else happened the rest of the day  This was the worst case of it that he had     2 days after that had a recurrence    Then stopped for a month    Then the past couple of months would have random episodes of this   Usually would just occur one a day when he would have it.  But can happen once a week   Not exertional   Not positional   But does seem to be worse with breathing.    Had covid last year January 2021 but hasn't had it since then     Doesn't seem to be timed with food.    Yesterday morning was the most recent attack  Was just getting into work and was standing up and had just a minor pain 2/10 when it hit lasted couple of minutes then went away    In between these episodes would have some discomfort still in the same spot     ROS:  as per hpi  denies headache  denies any nausea or vomiting  denies any amnesia, confusion or concussion symptoms  denies any blurring of vision  denies any otorrhea or rhinorhea  denies any neck pain  denies any back pain.  denies any shortness of breath  denies any joint pain except noted above.  denies any bowel or bladder incontinence or motor or sensory deficits.  denies any abdominal pain, nausea or vomiting or flank pain  denies any hematuria      Allergies   Allergen Reactions     Cefzil [Cefprozil] Hives     Codeine      Extremely sensitive to this drug - be careful with dose     Sulfa Drugs Swelling     Vancomycin      Amoxicillin Rash     Nystatin  Rash       Past Medical History:   Diagnosis Date     Depressive disorder Unknown     Uncomplicated asthma Forever       fexofenadine (ALLEGRA) 180 MG tablet, Take 180 mg by mouth  hydrOXYzine (ATARAX) 25 MG tablet, Take 1 tablet (25 mg) by mouth 2 times daily as needed for anxiety    No current facility-administered medications on file prior to visit.      Social History     Tobacco Use     Smoking status: Never Smoker     Smokeless tobacco: Never Used   Vaping Use     Vaping Use: Never used   Substance Use Topics     Alcohol use: No     Drug use: No       ROS:  review of systems negative except for noted above.       OBJECTIVE:  /71 (BP Location: Right arm, Patient Position: Semi-Antoine's, Cuff Size: Adult Regular)   Pulse 62   Temp 97.4  F (36.3  C) (Oral)   Resp 16   SpO2 100%    General:   awake, alert, and cooperative.  NAD.   Head: Normocephalic, atraumatic.  Eyes: Conjunctiva clear,   ENT: no periorbital ecchymosis, no otorrhea or rhinorrhea, negative Tavares's sign, no raccoon eyes, no hematympanum  Heart: Regular rate and rhythm. No murmur.  Lungs: Chest is clear; no wheezes or rales.   Abdomen: soft non-tender. No bruising noted.   Neuro: Alert and oriented - normal speech. Cranial nerves intact, MMT 5/5 all extremities, sensory intact, normal gait and normal cerebellar function  MS: Using extremities freely   No chest wall tenderness. PATIENT CURRENTLY HAS NO PAIN. NOT REPRODUCIBLE   , No cervical, thoracic, or lumbar spine tenderness  PSYCH:  Normal affect, normal speech  SKIN: no obvious rashes    Diagnostic Test Results:  Results for orders placed or performed in visit on 01/07/22 (from the past 24 hour(s))   Erythrocyte sedimentation rate auto   Result Value Ref Range    Erythrocyte Sedimentation Rate 1 0 - 15 mm/hr   D dimer quantitative   Result Value Ref Range    D-Dimer Quantitative <0.27 0.00 - 0.50 ug/mL FEU    Narrative    This D-dimer assay is intended for use in conjunction with a  clinical pretest probability assessment model to exclude pulmonary embolism (PE) and deep venous thrombosis (DVT) in outpatients suspected of PE or DVT. The cut-off value is 0.50 ug/mL FEU.   Comprehensive metabolic panel   Result Value Ref Range    Sodium 142 133 - 144 mmol/L    Potassium 3.9 3.4 - 5.3 mmol/L    Chloride 111 (H) 94 - 109 mmol/L    Carbon Dioxide (CO2) 27 20 - 32 mmol/L    Anion Gap 4 3 - 14 mmol/L    Urea Nitrogen 14 7 - 30 mg/dL    Creatinine 0.89 0.66 - 1.25 mg/dL    Calcium 9.4 8.5 - 10.1 mg/dL    Glucose 57 (L) 70 - 99 mg/dL    Alkaline Phosphatase 45 40 - 150 U/L    AST 19 0 - 45 U/L    ALT 23 0 - 70 U/L    Protein Total 7.1 6.8 - 8.8 g/dL    Albumin 4.2 3.4 - 5.0 g/dL    Bilirubin Total 0.6 0.2 - 1.3 mg/dL    GFR Estimate >90 >60 mL/min/1.73m2   CRP inflammation   Result Value Ref Range    CRP Inflammation <2.9 0.0 - 8.0 mg/L   CBC with platelets differential    Narrative    The following orders were created for panel order CBC with platelets differential.  Procedure                               Abnormality         Status                     ---------                               -----------         ------                     CBC with platelets and d...[[id number]]  Abnormal            Final result                 Please view results for these tests on the individual orders.   CBC with platelets and differential   Result Value Ref Range    WBC Count 4.9 4.0 - 11.0 10e3/uL    RBC Count 4.57 4.40 - 5.90 10e6/uL    Hemoglobin 14.3 13.3 - 17.7 g/dL    Hematocrit 41.9 40.0 - 53.0 %    MCV 92 78 - 100 fL    MCH 31.3 26.5 - 33.0 pg    MCHC 34.1 31.5 - 36.5 g/dL    RDW 12.7 10.0 - 15.0 %    Platelet Count 136 (L) 150 - 450 10e3/uL    % Neutrophils 57 %    % Lymphocytes 27 %    % Monocytes 11 %    % Eosinophils 4 %    % Basophils 1 %    % Immature Granulocytes 0 %    NRBCs per 100 WBC 0 <1 /100    Absolute Neutrophils 2.8 1.6 - 8.3 10e3/uL    Absolute Lymphocytes 1.3 0.8 - 5.3 10e3/uL    Absolute  Monocytes 0.5 0.0 - 1.3 10e3/uL    Absolute Eosinophils 0.2 0.0 - 0.7 10e3/uL    Absolute Basophils 0.1 0.0 - 0.2 10e3/uL    Absolute Immature Granulocytes 0.0 <=0.4 10e3/uL    Absolute NRBCs 0.0 10e3/uL         ASSESSMENT:    ICD-10-CM    1. Atypical chest pain  R07.89 sodium chloride (PF) 0.9% PF flush 3 mL     CBC with platelets differential     CRP inflammation     Comprehensive metabolic panel     D dimer quantitative     Troponin I     Erythrocyte sedimentation rate auto     EKG 12-lead complete w/read - Clinics     Erythrocyte sedimentation rate auto     Troponin I     D dimer quantitative     Comprehensive metabolic panel     CRP inflammation     CBC with platelets differential     CT Chest Pulmonary Embolism w Contrast     CT Chest Pulmonary Embolism w Contrast   2. Family history of blood clots  Z82.49 CT Chest Pulmonary Embolism w Contrast     CT Chest Pulmonary Embolism w Contrast   3. Pleuritic chest pain  R07.81 CT Chest Pulmonary Embolism w Contrast     Referral to Acute and Diagnostic Services (Day of diagnostic / First order acute)     CT Chest Pulmonary Embolism w Contrast   4. Acute chest pain  R07.9 Referral to Acute and Diagnostic Services (Day of diagnostic / First order acute)     CT Chest Pulmonary Embolism w Contrast   5. Thrombocytopenia (H)  D69.6        PLAN:   See AVS  Does not sound cardiac. ekg stable x 2 since last night. TNI negative  PE ruled out via d-dimer and CT chest.  crp and esr normal - normal ekg. Symptoms also not positional and clinical suspicion for pericarditis is low.  Possible costochondritis. Risks for this patient does a lot of lifting at work. Although not reproducible pain at this time.  Trial ibuprofen  Denies any history of ulcers or kidney disease or any known contraindication to NSAIDs  Follow up with primary care provider in 1 week for reevaluation  Also recommend follow up of platelet count, was mildly decreased today.    If with any symptoms of chest pain or  shortness of breath, lightheadedness, palpitations, feeling like passing out or change and worsening in the quality of your symptoms, please proceed to ER. Recommend follow up with PCP in a week for re-evaluation.     Patient voiced understanding and had no further questions      Bonnie Dougherty MD

## 2022-01-07 NOTE — Clinical Note
Hi!  I saw this patient at [facility].  Please see note.  We did a complete rule out today.  Suspect musculoskeletal / atypical chest pain.  Recommend close follow up with primary care provider and please also recheck platelet count.    Thank you for this referral!  Bonnie Dougherty M.D.

## 2022-01-07 NOTE — Clinical Note
Hi! Atypical chest pain ruled out for any serious condition at this time. Recommend ibuprofen as needed and follow up with you. :-)  His platelets are a bit low. Was hoping you could recheck for follow up!  Thank you hope you are staying healthy and safe  Bonnie Dougherty M.D.

## 2022-02-07 ENCOUNTER — OFFICE VISIT (OUTPATIENT)
Dept: FAMILY MEDICINE | Facility: CLINIC | Age: 27
End: 2022-02-07
Payer: COMMERCIAL

## 2022-02-07 VITALS
SYSTOLIC BLOOD PRESSURE: 117 MMHG | HEIGHT: 72 IN | DIASTOLIC BLOOD PRESSURE: 80 MMHG | OXYGEN SATURATION: 99 % | WEIGHT: 133 LBS | HEART RATE: 65 BPM | RESPIRATION RATE: 16 BRPM | BODY MASS INDEX: 18.01 KG/M2 | TEMPERATURE: 98.3 F

## 2022-02-07 DIAGNOSIS — Z28.01 VACCINATION NOT CARRIED OUT BECAUSE OF ACUTE ILLNESS: ICD-10-CM

## 2022-02-07 DIAGNOSIS — Z20.822 SUSPECTED 2019 NOVEL CORONAVIRUS INFECTION: ICD-10-CM

## 2022-02-07 DIAGNOSIS — R07.0 THROAT PAIN: Primary | ICD-10-CM

## 2022-02-07 LAB
DEPRECATED S PYO AG THROAT QL EIA: NEGATIVE
GROUP A STREP BY PCR: NOT DETECTED

## 2022-02-07 PROCEDURE — 87651 STREP A DNA AMP PROBE: CPT | Performed by: FAMILY MEDICINE

## 2022-02-07 PROCEDURE — U0003 INFECTIOUS AGENT DETECTION BY NUCLEIC ACID (DNA OR RNA); SEVERE ACUTE RESPIRATORY SYNDROME CORONAVIRUS 2 (SARS-COV-2) (CORONAVIRUS DISEASE [COVID-19]), AMPLIFIED PROBE TECHNIQUE, MAKING USE OF HIGH THROUGHPUT TECHNOLOGIES AS DESCRIBED BY CMS-2020-01-R: HCPCS | Mod: 90 | Performed by: FAMILY MEDICINE

## 2022-02-07 PROCEDURE — 99213 OFFICE O/P EST LOW 20 MIN: CPT | Mod: CS | Performed by: FAMILY MEDICINE

## 2022-02-07 PROCEDURE — U0005 INFEC AGEN DETEC AMPLI PROBE: HCPCS | Mod: 90 | Performed by: FAMILY MEDICINE

## 2022-02-07 PROCEDURE — 99000 SPECIMEN HANDLING OFFICE-LAB: CPT | Performed by: FAMILY MEDICINE

## 2022-02-07 ASSESSMENT — PAIN SCALES - GENERAL: PAINLEVEL: MODERATE PAIN (4)

## 2022-02-07 ASSESSMENT — MIFFLIN-ST. JEOR: SCORE: 1607.31

## 2022-02-07 NOTE — PATIENT INSTRUCTIONS
"  Patient Education   After Your COVID-19 (Coronavirus) Test  You have been tested for COVID-19 (coronavirus).   If you'll have surgery in the next few days, we'll let you know ahead of time if you have the virus. Please call your surgeon's office with any questions.  For all other patients: Results are usually available in [employer] within 2 to 3 days.   If you do not have a [employer] account, you'll get a letter in the mail in about 7 to 10 days.   castacliphart is often the fastest way to get test results. Please sign up if you do not already have a [employer] account. See the handout Getting COVID-19 Test Results in [employer] for help.  What if my test result is positive?  If your test is positive and you have not viewed your result in [employer], you'll get a phone call with your result. (A positive test means that you have the virus.)     Follow the tips under \"How do I self-isolate?\" below for 10 days (20 days if you have a weak immune system).    You don't need to be retested for COVID-19 before going back to school or work. As long as you're fever-free and feeling better, you can go back to school, work and other activities after waiting the 10 or 20 days.  What if I have questions after I get my results?  If you have questions about your results, please visit our testing website at [email or web address].   After 7 to 10 days, if you have not gotten your results:     Call [phone] ([phone]) and ask to speak with our COVID-19 results team.    If you're being treated at an infusion center: Call your infusion center directly.  What are the symptoms of COVID-19?  Cough, fever and trouble breathing are the most common signs of COVID-19.  Other symptoms can include new headaches, new muscle or body aches, new and unexplained fatigue (feeling very tired), chills, sore throat, congestion (stuffy or runny nose), diarrhea (loose poop), loss of taste or smell, belly pain, and nausea or vomiting " "(feeling sick to your stomach or throwing up).  You may already have symptoms of COVID-19, or they may show up later.  What should I do if I have symptoms?  If you're having surgery: Call your surgeon's office.  For all other patients: Stay home and away from others (self-isolate) until ...    You've had no fever--and no medicine that reduces fever--for 1 full day (24 hours), AND    Other symptoms have gotten better. For example, your cough or breathing has improved, AND    At least 10 days have passed since your symptoms first started.  How do I self-isolate?    Stay in your own room, even for meals. Use your own bathroom if you can.    Stay away from others in your home. No hugging, kissing or shaking hands. No visitors.    Don't go to work, school or anywhere else.    Clean \"high touch\" surfaces often (doorknobs, counters, handles). Use household cleaning spray or wipes. You'll find a full list of  on the EPA website: www.epa.gov/pesticide-registration/list-n-disinfectants-use-against-sars-cov-2.    Cover your mouth and nose with a mask or other face covering to avoid spreading germs.    Wash your hands and face often. Use soap and water.    Caregivers in these groups are at risk for severe illness due to COVID-19:  ? People 65 years and older  ? People who live in a nursing home or long-term care facility  ? People with chronic disease (lung, heart, cancer, diabetes, kidney, liver, immunologic)  ? People who have a weakened immune system, including those who:    Are in cancer treatment    Take medicine that weakens the immune system, such as corticosteroids    Had a bone marrow or organ transplant    Have an immune deficiency    Have poorly controlled HIV or AIDS    Are obese (body mass index of 40 or higher)    Smoke regularly    Caregivers should wear gloves while washing dishes, handling laundry and cleaning bedrooms and bathrooms.    Use caution when washing and drying laundry: Don't shake dirty " laundry and use the warmest water setting that you can.    For more tips on managing your health at home, go to www.cdc.gov/coronavirus/2019-ncov/downloads/10Things.pdf.  How can I take care of myself at home?  1. Get lots of rest. Drink extra fluids (unless a doctor has told you not to).  2. Take Tylenol (acetaminophen) for fever or pain. If you have liver or kidney problems, ask your family doctor if it's OK to take Tylenol.   Adults can take either:  ? 650 mg (two 325 mg pills) every 4 to 6 hours, or   ? 1,000 mg (two 500 mg pills) every 8 hours as needed.  ? Note: Don't take more than 3,000 mg in one day. Acetaminophen is found in many medicines (both prescribed and over-the-counter medicines). Read all labels to be sure you don't take too much.   For children, check the Tylenol bottle for the right dose. The dose is based on the child's age or weight.  3. If you have other health problems (like cancer, heart failure, an organ transplant or severe kidney disease): Call your specialty clinic if you don't feel better in the next 2 days.  4. Know when to call 911. Emergency warning signs include:  ? Trouble breathing or shortness of breath  ? Chest pain or pressure that doesn't go away  ? Feeling confused like you haven't felt before, or not being able to wake up  ? Bluish-colored lips or face  5. If your doctor prescribed a blood thinner medicine: Follow their instructions.  Where can I get more information?    [facility] - About COVID-19:   [email or web address]    CDC - If You're Sick: cdc.gov/coronavirus/2019-ncov/about/steps-when-sick.html    CDC - Ending Home Isolation: www.cdc.gov/coronavirus/2019-ncov/hcp/disposition-in-home-patients.html    CDC - Caring for Someone: www.cdc.gov/coronavirus/2019-ncov/if-you-are-sick/care-for-someone.html    [facility] - Interim Guidance for Hospital Discharge to Home: www.health.[facility].mn.us/diseases/coronavirus/hcp/hospdischarge.pdf    [facility]  clinical trials (COVID-19 research studies): clinicalaffairs.[employer].[facility]/[employer]-clinical-trials    Below are the COVID-19 hotlines at the [place] Department of Health ([facility]). Interpreters are available.  ? For health questions: Call 357-905-5793 or 1-783.135.6371 (7 a.m. to 7 p.m.)  ? For questions about schools and childcare: Call 444-893-6272 or 1-408.833.2579 (7 a.m. to 7 p.m.)    For informational purposes only. Not to replace the advice of your health care provider. Clinically reviewed by Infection Prevention and the [facility] COVID-19 Clinical Team. Copyright   2020 [facility]. All rights reserved. SMARTworks 208388 - Rev 11/11/20.             COVID-19 booster      Influenza      Tdap

## 2022-02-07 NOTE — PROGRESS NOTES
"  Assessment & Plan     (R07.0) Throat pain  (primary encounter diagnosis)  Comment:   Plan: Streptococcus A Rapid Screen w/Reflex to PCR -         Clinic Collect, Group A Streptococcus PCR         Throat Swab, Symptomatic; Yes; 2/5/2022         COVID-19 Virus (Coronavirus) by PCR Nose,         Symptomatic; Yes; 2/5/2022 COVID-19 Virus         (Coronavirus) by PCR Nose            (Z20.822) Suspected 2019 novel coronavirus infection  Comment:   Plan: Symptomatic; Yes; 2/5/2022 COVID-19 Virus         (Coronavirus) by PCR Nose, Symptomatic; Yes;         2/5/2022 COVID-19 Virus (Coronavirus) by PCR         Nose            (Z28.01) Vaccination not carried out because of acute illness  Comment: due foe Tdap, influenza, COVID-19 booster  Plan:       Return in about 8 days (around 2/15/2022) for recheck if symptoms fail to resolve by then.    Jorge George MD  [facility]    Isela Shaver is a 26 year old who presents for the following health issues     HPI     Acute Illness  Acute illness concerns: throat pain   Onset/Duration: nabor set 2/5/22  Symptoms:  Fever: no  Chills/Sweats: no  Headache (location?): YES  Sinus Pressure: YES  Conjunctivitis:  no  Ear Pain: no  Rhinorrhea: YES  Congestion: YES  Sore Throat: YES  Cough: no  Wheeze: no  Decreased Appetite: YES  Nausea: no  Vomiting: no  Diarrhea: no  Dysuria/Freq.: not applicable  Dysuria or Hematuria: not applicable  Fatigue/Achiness: YES  Sick/Strep Exposure: no  Therapies tried and outcome: None      Review of Systems   Constitutional, HEENT, cardiovascular, pulmonary, gi and gu systems are negative, except as otherwise noted.      Objective    /80 (BP Location: Left arm, Patient Position: Chair, Cuff Size: Adult Regular)   Pulse 65   Temp 98.3  F (36.8  C) (Tympanic)   Resp 16   Ht 1.822 m (5' 11.75\")   Wt 60.3 kg (133 lb)   SpO2 99%   BMI 18.16 kg/m    Body mass index is 18.16 kg/m .  Physical Exam   GENERAL: " healthy, alert and no distress  EYES: Eyes grossly normal to inspection, PERRL and conjunctivae and sclerae normal  HENT: ear canals and TM's normal, nose and mouth without ulcers or lesions  NECK: no adenopathy, no asymmetry, masses, or scars and thyroid normal to palpation  RESP: lungs clear to auscultation - no rales, rhonchi or wheezes  CV: regular rate and rhythm, normal S1 S2, no S3 or S4, no murmur, click or rub, no peripheral edema and peripheral pulses strong    Rapid Strep screen is negative.

## 2022-02-07 NOTE — LETTER
[address] [address]  Phone: [phone]    February 7, 2022        Sivakumar Alonzo  [address] [place] [id number]          To whom it may concern:    RE: Sivakumar Alonzo    Patient was seen and treated today at our clinic and missed work today and tomorrow, pending COVID-19 test results.    Please contact me for questions or concerns.      Sincerely,        Jorge George MD

## 2022-02-08 LAB — SARS-COV-2 RNA RESP QL NAA+PROBE: NOT DETECTED

## 2022-04-22 ENCOUNTER — OFFICE VISIT (OUTPATIENT)
Dept: URGENT CARE | Facility: URGENT CARE | Age: 27
End: 2022-04-22
Payer: COMMERCIAL

## 2022-04-22 ENCOUNTER — TELEPHONE (OUTPATIENT)
Dept: FAMILY MEDICINE | Facility: CLINIC | Age: 27
End: 2022-04-22
Payer: COMMERCIAL

## 2022-04-22 VITALS
OXYGEN SATURATION: 98 % | DIASTOLIC BLOOD PRESSURE: 76 MMHG | BODY MASS INDEX: 18.14 KG/M2 | SYSTOLIC BLOOD PRESSURE: 113 MMHG | WEIGHT: 132.8 LBS | TEMPERATURE: 99 F | HEART RATE: 97 BPM

## 2022-04-22 DIAGNOSIS — R07.0 THROAT PAIN: Primary | ICD-10-CM

## 2022-04-22 LAB
DEPRECATED S PYO AG THROAT QL EIA: NEGATIVE
GROUP A STREP BY PCR: NOT DETECTED

## 2022-04-22 PROCEDURE — 99213 OFFICE O/P EST LOW 20 MIN: CPT | Performed by: PHYSICIAN ASSISTANT

## 2022-04-22 PROCEDURE — 87651 STREP A DNA AMP PROBE: CPT | Performed by: PHYSICIAN ASSISTANT

## 2022-04-22 ASSESSMENT — ENCOUNTER SYMPTOMS
SHORTNESS OF BREATH: 0
HEADACHES: 1
FEVER: 0
SORE THROAT: 1
COUGH: 0

## 2022-04-22 NOTE — TELEPHONE ENCOUNTER
"Pt calling stating that he has a sore throat and submitted an Evisit to PCP regarding this last night.  Pt states he requires a response from provider by noon today as he is scheduled to work today. Pt states he is considering going to urgent care at 10am instead.    RN advised PCP is not in the office today, and unable to guarantee a response time from provider, but generally Evisits are responded to within 1 business day.  RN advised urgent care is an option, but pt may also consider cancelling the Evisit that has been sent to PCP and submit the Evisit to \"Next Available Provider\" instead or a quicker response.    ERASMO HodgsonN, RN  "

## 2022-04-22 NOTE — PROGRESS NOTES
SUBJECTIVE:   Sivakumar Alonzo is a 27 year old male presenting with a chief complaint of   Chief Complaint   Patient presents with     Sick     Throat pain, swollen glands, temps off and on, headache since Wednesday.       He is an established patient of [facility].  Covid vaccinated x 2.  ST x 2 days.  No fever.  Little cough.  Patient had covid 2 years ago January.  Patient tested for covid - negative.      Treatment:  niquil    Review of Systems   Constitutional: Negative for fever.   HENT: Positive for congestion and sore throat. Negative for ear pain.    Respiratory: Negative for cough and shortness of breath.    Neurological: Positive for headaches.   All other systems reviewed and are negative.      Past Medical History:   Diagnosis Date     Depressive disorder Unknown     Uncomplicated asthma Forever     History reviewed. No pertinent family history.  Current Outpatient Medications   Medication Sig Dispense Refill     fexofenadine (ALLEGRA) 180 MG tablet Take 180 mg by mouth (Patient not taking: No sig reported)       Social History     Tobacco Use     Smoking status: Never Smoker     Smokeless tobacco: Never Used   Substance Use Topics     Alcohol use: No       OBJECTIVE  /76   Pulse 97   Temp 99  F (37.2  C) (Tympanic)   Wt 60.2 kg (132 lb 12.8 oz)   SpO2 98%   BMI 18.14 kg/m      Physical Exam  Vitals and nursing note reviewed.   Constitutional:       Appearance: Normal appearance. He is normal weight.   HENT:      Head: Normocephalic and atraumatic.      Right Ear: Tympanic membrane, ear canal and external ear normal.      Left Ear: Tympanic membrane, ear canal and external ear normal.      Nose: Nose normal.      Mouth/Throat:      Mouth: Mucous membranes are moist.      Pharynx: Oropharynx is clear. Posterior oropharyngeal erythema present.   Eyes:      Extraocular Movements: Extraocular movements intact.      Conjunctiva/sclera: Conjunctivae normal.   Cardiovascular:      Rate and Rhythm:  Normal rate and regular rhythm.      Pulses: Normal pulses.      Heart sounds: Normal heart sounds.   Pulmonary:      Effort: Pulmonary effort is normal.      Breath sounds: Normal breath sounds.   Musculoskeletal:      Cervical back: Normal range of motion and neck supple. No rigidity. No muscular tenderness.   Skin:     General: Skin is warm and dry.   Neurological:      General: No focal deficit present.      Mental Status: He is alert.   Psychiatric:         Mood and Affect: Mood normal.         Behavior: Behavior normal.         Labs:  Results for orders placed or performed in visit on 04/22/22 (from the past 24 hour(s))   Streptococcus A Rapid Screen w/Reflex to PCR - Clinic Collect    Specimen: Throat; Swab   Result Value Ref Range    Group A Strep antigen Negative Negative       X-Ray was not done.    ASSESSMENT:      ICD-10-CM    1. Throat pain  R07.0 Streptococcus A Rapid Screen w/Reflex to PCR - Clinic Collect     Group A Streptococcus PCR Throat Swab        Medical Decision Making:    Differential Diagnosis:  URI Adult/Peds:  Strep pharyngitis, Viral pharyngitis and Viral upper respiratory illness    Serious Comorbid Conditions:  Adult:  reviewed    PLAN:  Tylenol/motrin as needed.  Drink plenty of water.  Gargle with salt water.  May use chloraseptic spray as directed for ST.  Strep pcr pending.  Note for work.        Followup:    If not improving or if condition worsens, follow up with your Primary Care Provider, If not improving or if conditions worsens over the next 12-24 hours, go to the Emergency Department    There are no Patient Instructions on file for this visit.

## 2022-04-22 NOTE — LETTER
[facility]  [address] [facility] [address]  Phone: [phone]    April 22, 2022        Sivakumar lAonzo  [address]  [facility] [address]          To whom it may concern:    RE: Sivakumar Alonzo    Patient was seen and treated today at our clinic.    Please contact me for questions or concerns.      Sincerely,        Conchis Veloz

## 2022-04-24 ENCOUNTER — NURSE TRIAGE (OUTPATIENT)
Dept: NURSING | Facility: CLINIC | Age: 27
End: 2022-04-24
Payer: COMMERCIAL

## 2022-04-24 NOTE — TELEPHONE ENCOUNTER
Nurse Triage SBAR    Is this a 2nd Level Triage? NO    Situation: Patient called to report he has lost his voice and has a cough.    Background: :Patient reports he was seen in clinic on 4/20/22 for sore throat.  He tested negative for strep and covid.  Patient reports sore throat improved on 4/22/22.    Assessment: Patient reports hoarseness and cough began on 4/22/22.  He reports congested cough.  He denies pain, chest pressure, fever, or difficulty breathing.  He denies injury to throat.  Patient denies difficulty swallowing secretions.      Protocol Recommended Disposition:     According to the protocol, patient should continue with home care.  Care advice given including drinking warm liquids, use humidifier, and rest voice. Advised when to call back.    CALL BACK IF:   * Difficulty breathing occurs   * Hoarseness lasts over 2 weeks   * You become worse.    Bisi Lawrence RN  04/24/22 6:13 PM  [facility] Nurse Advisor    COVID 19 Nurse Triage Plan/Patient Instructions    Please be aware that novel coronavirus (COVID-19) may be circulating in the community. If you develop symptoms such as fever, cough, or SOB or if you have concerns about the presence of another infection including coronavirus (COVID-19), please contact your health care provider or visit [email or web address].     Disposition/Instructions    Home care recommended. Follow home care protocol based instructions.    Thank you for taking steps to prevent the spread of this virus.  o Limit your contact with others.  o Wear a simple mask to cover your cough.  o Wash your hands well and often.    Resources    [facility]: About COVID-19: [email or web address]    CDC: What to Do If You're Sick: www.cdc.gov/coronavirus/2019-ncov/about/steps-when-sick.html    CDC: Ending Home Isolation: www.cdc.gov/coronavirus/2019-ncov/hcp/disposition-in-home-patients.html     CDC: Caring for Someone:  www.cdc.gov/coronavirus/2019-ncov/if-you-are-sick/care-for-someone.html     [facility]: Interim Guidance for Hospital Discharge to Home: www.health.[facility].mn.us/diseases/coronavirus/hcp/hospdischarge.pdf    [facility] clinical trials (COVID-19 research studies): clinicalaffairs.[facility]/umn-clinical-trials     Below are the COVID-19 hotlines at the [place] Department of Health ([facility]). Interpreters are available.   o For health questions: Call 430-495-8865 or 1-619.278.3179 (7 a.m. to 7 p.m.)  o For questions about schools and childcare: Call 564-541-3716 or 1-919.416.5035 (7 a.m. to 7 p.m.)         Reason for Disposition    Mild hoarseness    Additional Information    Negative: Severe difficulty breathing (e.g., struggling for each breath, speaks in single words)    Negative: Bluish (or gray) lips or face now    Negative: [1] Stridor AND [2] difficulty breathing    Negative: Started suddenly after sting from bee, wasp, or yellow jacket    Negative: Started suddenly after taking a medicine or allergic food (e.g., nuts, seafood)    Negative: Started suddenly along with widespread hives    Negative: Can't swallow normal secretions (e.g., drooling or spitting)    Negative: Tongue or facial swelling    Negative: Sounds like a life-threatening emergency to the triager    Negative: [1] Nasal allergies also present AND [2] they are acting up    Negative: Cold symptoms are main concern    Negative: Sore throat is main symptom    Negative: [1] Resolved choking episode AND [2] hoarseness lasts > 30 minutes    Negative: Direct blow to front of neck    Negative: Difficulty breathing    Negative: [1] Hoarseness starting in past 24 hours AND [2] taking an ACE Inhibitor medication (e.g., benazepril/LOTENSIN, captopril/CAPOTEN, enalapril/VASOTEC, lisinopril/ZESTRIL)    Negative: Patient sounds very sick or weak to the triager    Negative: Fever > 103 F (39.4 C)    Negative: Fever present > 3 days (72 hours)    Negative: Severe  sore throat pain    Negative: [1] Hoarseness starting > 24 hours ago AND [2] taking an ACE Inhibitor medication (e.g., benazepril/LOTENSIN, captopril/CAPOTEN, enalapril/VASOTEC, lisinopril/ZESTRIL)    Negative: Hoarseness persists > 2 weeks    Negative: Hoarseness is a chronic symptom (recurrent or ongoing AND present > 4 weeks)    Protocols used: [id number]

## 2022-07-30 ENCOUNTER — NURSE TRIAGE (OUTPATIENT)
Dept: NURSING | Facility: CLINIC | Age: 27
End: 2022-07-30

## 2022-07-31 NOTE — TELEPHONE ENCOUNTER
S-(situation): Call from patient who thinks he may have food poisoning since Monday night. Symptoms: stomach cramp, diarrhea with blood (average 4 x/day (3-6 times)).    Currently no fever but did have a fever earlier in the week.    B-(background):     A-(assessment): needs evaluation      R-(recommendations): be seen w/i 24 hrs; go to     Reviewed care advice with caller per RN triage protocol guideline.  Advised to call back with worsening symptoms, concerns or questions.   Caller verbalized understanding.          Gypsy Doe RN/[facility]

## 2022-07-31 NOTE — TELEPHONE ENCOUNTER
Reason for Disposition    [1] Blood in the stool AND [2] small amount of blood   (Exception: only on toilet paper. Reason: diarrhea can cause rectal irritation with blood on wiping)    Additional Information    Negative: Shock suspected (e.g., cold/pale/clammy skin, too weak to stand, low BP, rapid pulse)    Negative: Difficult to awaken or acting confused (e.g., disoriented, slurred speech)    Negative: Sounds like a life-threatening emergency to the triager    Negative: [1] SEVERE abdominal pain (e.g., excruciating) AND [2] present > 1 hour    Negative: [1] SEVERE abdominal pain AND [2] age > 60    Negative: [1] Blood in the stool AND [2] moderate or large amount of blood    Negative: Black or tarry bowel movements  (Exception: chronic-unchanged  black-grey bowel movements AND is taking iron pills or Pepto-bismol)    Negative: [1] Drinking very little AND [2] dehydration suspected (e.g., no urine > 12 hours, very dry mouth, very lightheaded)    Negative: Patient sounds very sick or weak to the triager    Negative: [1] SEVERE diarrhea (e.g., 7 or more times / day more than normal) AND [2] age > 60 years    Negative: [1] Constant abdominal pain AND [2] present > 2 hours    Negative: [1] Fever > 103 F (39.4 C) AND [2] not able to get the fever down using Fever Care Advice    Negative: [1] SEVERE diarrhea (e.g., 7 or more times / day more than normal) AND [2] present > 24 hours (1 day)    Negative: [1] MODERATE diarrhea (e.g., 4-6 times / day more than normal) AND [2] present > 48 hours (2 days)    Negative: [1] MODERATE diarrhea (e.g., 4-6 times / day more than normal) AND [2] age > 70 years    Negative: Fever > 101 F (38.3 C)    Negative: Abdominal pain  (Exception: Pain clears with each passage of diarrhea stool)    Negative: Fever present > 3 days (72 hours)    Protocols used: DIARRHEA-A-

## 2022-08-20 ENCOUNTER — HEALTH MAINTENANCE LETTER (OUTPATIENT)
Age: 27
End: 2022-08-20

## 2022-11-20 ENCOUNTER — HEALTH MAINTENANCE LETTER (OUTPATIENT)
Age: 27
End: 2022-11-20

## 2022-12-09 ENCOUNTER — OFFICE VISIT (OUTPATIENT)
Dept: URGENT CARE | Facility: URGENT CARE | Age: 27
End: 2022-12-09
Payer: COMMERCIAL

## 2022-12-09 VITALS
HEART RATE: 74 BPM | HEIGHT: 71 IN | BODY MASS INDEX: 18.89 KG/M2 | WEIGHT: 134.9 LBS | OXYGEN SATURATION: 98 % | TEMPERATURE: 98.7 F | SYSTOLIC BLOOD PRESSURE: 105 MMHG | DIASTOLIC BLOOD PRESSURE: 70 MMHG

## 2022-12-09 DIAGNOSIS — J06.9 VIRAL UPPER RESPIRATORY TRACT INFECTION WITH COUGH: Primary | ICD-10-CM

## 2022-12-09 DIAGNOSIS — J02.9 SORE THROAT: ICD-10-CM

## 2022-12-09 LAB — DEPRECATED S PYO AG THROAT QL EIA: NEGATIVE

## 2022-12-09 PROCEDURE — 87651 STREP A DNA AMP PROBE: CPT | Performed by: PHYSICIAN ASSISTANT

## 2022-12-09 PROCEDURE — 99213 OFFICE O/P EST LOW 20 MIN: CPT | Performed by: PHYSICIAN ASSISTANT

## 2022-12-09 ASSESSMENT — ENCOUNTER SYMPTOMS
CARDIOVASCULAR NEGATIVE: 1
SORE THROAT: 1
CHEST TIGHTNESS: 0
CHILLS: 0
GASTROINTESTINAL NEGATIVE: 1
FREQUENCY: 0
HEADACHES: 0
WHEEZING: 0
NEUROLOGICAL NEGATIVE: 1
VOMITING: 0
HEMATURIA: 0
ABDOMINAL PAIN: 0
SINUS PAIN: 0
DIARRHEA: 0
DYSURIA: 0
FEVER: 0
MUSCULOSKELETAL NEGATIVE: 1
MYALGIAS: 0
SHORTNESS OF BREATH: 0
ALLERGIC/IMMUNOLOGIC NEGATIVE: 1
SINUS PRESSURE: 0
CONSTITUTIONAL NEGATIVE: 1
COUGH: 1
PALPITATIONS: 0
NAUSEA: 0

## 2022-12-09 NOTE — PROGRESS NOTES
"Chief Complaint:     Chief Complaint   Patient presents with     Pharyngitis     Cough     Nasal Congestion       Results for orders placed or performed in visit on 12/09/22   Streptococcus A Rapid Screen w/Reflex to PCR - Clinic Collect     Status: Normal    Specimen: Throat; Swab   Result Value Ref Range    Group A Strep antigen Negative Negative       Medical Decision Making:    Vital signs reviewed by Popeye Cazares PA-C  /70 (BP Location: Right arm, Patient Position: Sitting, Cuff Size: Adult Regular)   Pulse 74   Temp 98.7  F (37.1  C) (Tympanic)   Ht 1.803 m (5' 11\")   Wt 61.2 kg (134 lb 14.4 oz)   SpO2 98%   BMI 18.81 kg/m      Differential Diagnosis:  URI Adult/Peds:  Bronchitis-viral, Influenza, Pneumonia, Sinusitis, Strep pharyngitis, Tonsilitis, Viral pharyngitis, Viral syndrome and Viral upper respiratory illness        ASSESSMENT    1. Viral upper respiratory tract infection with cough    2. Sore throat      PLAN    Patient is in no acute distress.    Temp is 98.7 in clinic today, lung sounds were clear, and O2 sats at 98% on RA.    RST was negative.  We will call with PCR results only if positive.  Rest, Push fluids, vaporizer, elevation of head of bed.  Ibuprofen and or Tylenol for any fever or body aches.  Over the counter cough suppressant- PRN- as discussed.   If symptoms worsen, recheck immediately otherwise follow up with your PCP in 1 week if symptoms are not improving.  Worrisome symptoms discussed with instructions to go to the ED.  Patient verbalized understanding and agreed with this plan.    Labs:    Results for orders placed or performed in visit on 12/09/22   Streptococcus A Rapid Screen w/Reflex to PCR - Clinic Collect     Status: Normal    Specimen: Throat; Swab   Result Value Ref Range    Group A Strep antigen Negative Negative        Vital signs reviewed by Popeye Cazares PA-C  /70 (BP Location: Right arm, Patient Position: Sitting, Cuff Size: Adult Regular)   " "Pulse 74   Temp 98.7  F (37.1  C) (Tympanic)   Ht 1.803 m (5' 11\")   Wt 61.2 kg (134 lb 14.4 oz)   SpO2 98%   BMI 18.81 kg/m      Current Meds      Current Outpatient Medications:      fexofenadine (ALLEGRA) 180 MG tablet, Take 180 mg by mouth (Patient not taking: Reported on 2/7/2022), Disp: , Rfl:       Respiratory History    occasional episodes of bronchitis      SUBJECTIVE    HPI: Sivakumar Alonzo is an 27 year old male who presents with chest congestion, cough nonproductive, occasional and sore throat.  Symptoms began 4  days ago and has unchanged.  There is no shortness of breath, wheezing and chest pain.  Patient is eating and drinking well.  No fever, nausea, vomiting, or diarrhea.    Patient denies any recent travel or exposure to known COVID positive tested individual.      ROS:     Review of Systems   Constitutional: Negative.  Negative for chills and fever.   HENT: Positive for congestion and sore throat. Negative for ear discharge, ear pain, sinus pressure and sinus pain.    Respiratory: Positive for cough. Negative for chest tightness, shortness of breath and wheezing.    Cardiovascular: Negative.  Negative for chest pain and palpitations.   Gastrointestinal: Negative.  Negative for abdominal pain, diarrhea, nausea and vomiting.   Genitourinary: Negative for dysuria, frequency, hematuria and urgency.   Musculoskeletal: Negative.  Negative for myalgias.   Skin: Negative for rash.   Allergic/Immunologic: Negative.  Negative for immunocompromised state.   Neurological: Negative.  Negative for headaches.         Family History   No family history on file.     Problem history  Patient Active Problem List   Diagnosis     Asthma     Mixed anxiety depressive disorder     Hx borderline personality disorder (H)        Allergies  Allergies   Allergen Reactions     Cefzil [Cefprozil] Hives     Codeine      Extremely sensitive to this drug - be careful with dose     Sulfa Drugs Swelling     Vancomycin      " "Amoxicillin Rash     Nystatin Rash        Social History  Social History     Socioeconomic History     Marital status: Single     Spouse name: Not on file     Number of children: Not on file     Years of education: Not on file     Highest education level: Not on file   Occupational History     Not on file   Tobacco Use     Smoking status: Never     Smokeless tobacco: Never   Vaping Use     Vaping Use: Never used   Substance and Sexual Activity     Alcohol use: No     Drug use: No     Sexual activity: Not Currently     Partners: Female     Birth control/protection: None   Other Topics Concern     Parent/sibling w/ CABG, MI or angioplasty before 65F 55M? No   Social History Narrative     Not on file     Social Determinants of Health     Financial Resource Strain: Not on file   Food Insecurity: Not on file   Transportation Needs: Not on file   Physical Activity: Not on file   Stress: Not on file   Social Connections: Not on file   Intimate Partner Violence: Not on file   Housing Stability: Not on file        OBJECTIVE     Vital signs reviewed by Popeye Cazares PA-C  /70 (BP Location: Right arm, Patient Position: Sitting, Cuff Size: Adult Regular)   Pulse 74   Temp 98.7  F (37.1  C) (Tympanic)   Ht 1.803 m (5' 11\")   Wt 61.2 kg (134 lb 14.4 oz)   SpO2 98%   BMI 18.81 kg/m       Physical Exam  Vitals reviewed.   Constitutional:       General: He is not in acute distress.     Appearance: He is well-developed. He is not ill-appearing, toxic-appearing or diaphoretic.   HENT:      Head: Normocephalic and atraumatic.      Right Ear: Hearing, tympanic membrane, ear canal and external ear normal. No drainage, swelling or tenderness. Tympanic membrane is not perforated, erythematous, retracted or bulging.      Left Ear: Hearing, tympanic membrane, ear canal and external ear normal. No drainage, swelling or tenderness. Tympanic membrane is not perforated, erythematous, retracted or bulging.      Nose: Congestion and " rhinorrhea present. No nasal tenderness or mucosal edema.      Right Turbinates: Not enlarged or swollen.      Left Turbinates: Not enlarged or swollen.      Right Sinus: No maxillary sinus tenderness or frontal sinus tenderness.      Left Sinus: No maxillary sinus tenderness or frontal sinus tenderness.      Mouth/Throat:      Pharynx: Posterior oropharyngeal erythema present. No pharyngeal swelling, oropharyngeal exudate or uvula swelling.      Tonsils: No tonsillar exudate. 0 on the right. 0 on the left.   Eyes:      General: Lids are normal.         Right eye: No discharge.         Left eye: No discharge.      Conjunctiva/sclera: Conjunctivae normal.      Right eye: Right conjunctiva is not injected. No exudate.     Left eye: Left conjunctiva is not injected. No exudate.     Pupils: Pupils are equal, round, and reactive to light.   Cardiovascular:      Rate and Rhythm: Normal rate and regular rhythm.      Heart sounds: Normal heart sounds. No murmur heard.    No friction rub. No gallop.   Pulmonary:      Effort: Pulmonary effort is normal. No accessory muscle usage, respiratory distress or retractions.      Breath sounds: Normal breath sounds and air entry. No stridor, decreased air movement or transmitted upper airway sounds. No decreased breath sounds, wheezing, rhonchi or rales.   Chest:      Chest wall: No tenderness.   Abdominal:      General: Bowel sounds are normal. There is no distension.      Palpations: Abdomen is soft. Abdomen is not rigid. There is no mass.      Tenderness: There is no abdominal tenderness. There is no guarding or rebound.   Musculoskeletal:         General: Normal range of motion.      Cervical back: Normal range of motion and neck supple.   Lymphadenopathy:      Head:      Right side of head: No submental, submandibular, tonsillar, preauricular or posterior auricular adenopathy.      Left side of head: No submental, submandibular, tonsillar, preauricular or posterior auricular  adenopathy.      Cervical:      Right cervical: No superficial or posterior cervical adenopathy.     Left cervical: No superficial or posterior cervical adenopathy.   Skin:     General: Skin is warm.      Capillary Refill: Capillary refill takes less than 2 seconds.   Neurological:      Mental Status: He is alert and oriented to person, place, and time.      Cranial Nerves: No cranial nerve deficit.      Sensory: No sensory deficit.      Motor: No abnormal muscle tone.      Coordination: Coordination normal.      Deep Tendon Reflexes: Reflexes normal.   Psychiatric:         Behavior: Behavior normal. Behavior is cooperative.         Thought Content: Thought content normal.         Judgment: Judgment normal.           Popeye Cazares PA-C  12/9/2022, 1:51 PM

## 2022-12-10 LAB — GROUP A STREP BY PCR: NOT DETECTED

## 2023-02-03 ENCOUNTER — OFFICE VISIT (OUTPATIENT)
Dept: URGENT CARE | Facility: URGENT CARE | Age: 28
End: 2023-02-03
Payer: OTHER MISCELLANEOUS

## 2023-02-03 VITALS
DIASTOLIC BLOOD PRESSURE: 76 MMHG | TEMPERATURE: 98.2 F | HEART RATE: 62 BPM | SYSTOLIC BLOOD PRESSURE: 127 MMHG | BODY MASS INDEX: 18.83 KG/M2 | RESPIRATION RATE: 14 BRPM | WEIGHT: 135 LBS | OXYGEN SATURATION: 99 %

## 2023-02-03 DIAGNOSIS — R53.1 WEAKNESS: ICD-10-CM

## 2023-02-03 DIAGNOSIS — R53.83 FATIGUE, UNSPECIFIED TYPE: ICD-10-CM

## 2023-02-03 DIAGNOSIS — S09.90XA INJURY OF HEAD, INITIAL ENCOUNTER: Primary | ICD-10-CM
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
## 2023-02-03 DIAGNOSIS — R41.0 CONFUSION: ICD-10-CM

## 2023-02-03 PROCEDURE — 99214 OFFICE O/P EST MOD 30 MIN: CPT | Performed by: NURSE PRACTITIONER

## 2023-02-03 ASSESSMENT — PAIN SCALES - GENERAL: PAINLEVEL: MILD PAIN (2)

## 2023-02-03 NOTE — PROGRESS NOTES
"Assessment & Plan     Injury of head, initial encounter    Confusion    Fatigue, unspecified type    Weakness    Work related injury           [facility] recommends CT; 4.3% risk of clinically important Traumatic Brain Injury.     Recommend further evaluation in emergency room for head injury with confusion, fatigue, weakness, as advanced imaging indicated, cannot rule out potentially lethal brain bleed. Patient agreeable and declines ambulance. He is discharged in stable condition as his mom will drive him.       Minnie Rust NP  [facility] [place]    Isela Shaver is a 27 year old male who presents to clinic today for the following health issues:  Chief Complaint   Patient presents with     Head Injury     Hit in the head yesterday afternoon by a machine that fell from about 20 feet high. Mild headache and throbbing since. Couldn't sleep last night. Lost balance, dizziness.      Head Injury    Onset of symptoms was 1 day(s) ago.  Mechanism of Injury: Was hit in head with caulking gun about 5 pounds 18-20 inches long that fell from about 20 feet and he fell off a ladder but caught himself on another ladder and did not hit the ground while working  Loss of consciousness: No  Course of illness is worsening.    Current and Associated symptoms: intermittent headache, slow processing, fatigue, confusion, double vision. He was not able to sleep last night. He was dizzy which resolved.  He states he didn't \"know what his mental state was thinking.\"   Denies nausea, emesis   Treatment measures tried include: None  No history of concussion      Employer: [employer]  Job Title: Wild life technician  Date of injury: 2/2/23  Time of injury: 3;30pm    Problem list, Medication list, Allergies, and Medical history reviewed in EPIC.    ROS:  Review of systems negative except for noted above        Objective    /76 (BP Location: Right arm, Cuff Size: Adult Regular)   Pulse 62 "   Temp 98.2  F (36.8  C) (Tympanic)   Resp 14   Wt 61.2 kg (135 lb)   SpO2 99%   BMI 18.83 kg/m    Physical Exam  Constitutional:       General: He is not in acute distress.     Appearance: Normal appearance. He is not toxic-appearing or diaphoretic.   HENT:      Head: Normocephalic and atraumatic.   Eyes:      Extraocular Movements: Extraocular movements intact.      Pupils: Pupils are equal, round, and reactive to light.   Neurological:      Mental Status: He is oriented to person, place, and time.      Sensory: No sensory deficit.      Motor: Weakness present.      Gait: Gait normal.      Comments: Slow processing, weakness bilateral upper extremities, delayed following commands

## 2023-02-03 NOTE — PATIENT INSTRUCTIONS
Go to emergency room for further evaluation of head injury yesterday with confusion, slow processing, fatigue, headache, double vision

## 2023-05-16 ENCOUNTER — OFFICE VISIT (OUTPATIENT)
Dept: URGENT CARE | Facility: URGENT CARE | Age: 28
End: 2023-05-16
Payer: COMMERCIAL

## 2023-05-16 VITALS
OXYGEN SATURATION: 98 % | HEART RATE: 79 BPM | DIASTOLIC BLOOD PRESSURE: 68 MMHG | TEMPERATURE: 98.1 F | BODY MASS INDEX: 18.97 KG/M2 | WEIGHT: 136 LBS | RESPIRATION RATE: 16 BRPM | SYSTOLIC BLOOD PRESSURE: 107 MMHG

## 2023-05-16 DIAGNOSIS — J06.9 VIRAL URI WITH COUGH: Primary | ICD-10-CM

## 2023-05-16 DIAGNOSIS — J02.9 PHARYNGITIS, UNSPECIFIED ETIOLOGY: ICD-10-CM

## 2023-05-16 LAB
DEPRECATED S PYO AG THROAT QL EIA: NEGATIVE
FLUAV RNA SPEC QL NAA+PROBE: NEGATIVE
FLUBV RNA RESP QL NAA+PROBE: NEGATIVE
GROUP A STREP BY PCR: NOT DETECTED
RSV RNA SPEC NAA+PROBE: NEGATIVE
SARS-COV-2 RNA RESP QL NAA+PROBE: NEGATIVE

## 2023-05-16 PROCEDURE — 87637 SARSCOV2&INF A&B&RSV AMP PRB: CPT

## 2023-05-16 PROCEDURE — 99213 OFFICE O/P EST LOW 20 MIN: CPT

## 2023-05-16 PROCEDURE — 87651 STREP A DNA AMP PROBE: CPT

## 2023-05-16 NOTE — PROGRESS NOTES
URGENT CARE  Assessment & Plan   Assessment:   Sivakumar Alonzo is a 28 year old male who's clinical presentation today is consistent with:   1. Viral URI with cough  - Symptomatic Influenza A/B, RSV, & SARS-CoV2 PCR (COVID-19) Nose  2. Pharyngitis, unspecified etiology  - Streptococcus A Rapid Screen w/Reflex to PCR - Clinic Collect  - benzocaine (CHLORASEPTIC) 15 MG lozenge; Place 1 lozenge    No alarm signs or symptoms present   Differential Diagnoses for this patient's CC include   Bacterial vs viral etiology of pharyngitis   peritonsillar abscess, Tonsillitis, mono     Plan:  Will treat patient with supportive and symptomatic measures for pharyngitis at this time which include: Fluids, rest, over-the-counter medications; , decongestants, antihistamines, and expectorants, side effects of medications reviewed. Educated patient that honey, warm fluids and gargling saltwater can also help    additionally tested for bacterial cause and rapid test was negative for streptococcal A; PCR test pending (updated pt results will come via mychart/call and rx will be reflexed if positive), additionally, educated patient to monitor their symptoms for improvement over the next week and to return for a follow up if the sore throat and/or tonsil swelling does not improve in the next 5-7 days. Educated patient on the warning signs of a peritonsillar abscess and to report to the emergency department if she notices any of these (trismus, dysphonia, uvular deviation, unilateral edema of peritonsillar region    Patient  is  agreeable to treatment plan and state they will follow-up if symptoms do not improve and/or if symptoms worsen (see patient's AVS 'monitor for' section for specific patient instructions given and discussed regarding what to watch for and when to follow up)    Medications ordered are listed above, please see AVS for patient's specific and personalized discharge instructions given     CARLOS ALBERTO Murphy CNP  [facility]  [place]      ______________________________________________________________________        Subjective  Subjective     HPI: Sivakumar Alonzo  is a 28 year old  male who presents today for evaluation the following concerns:   Patient endorses a sore throat today which started 1 days ago on 5/15/23   Patient reports they have been experiencing a sore throat along with a headache, and nasal congestion    Patient  denies} a history of strep throat   Patient denies any fevers} sweats, chills, myalgias, wheezing, shortness of breath, difficulty breathing, chest pain, weakness or signs of dehydration   Patient denies any difficulty opening jaw, dysphonia/voice changes, uvular deviation, or unilateral edema of peritonsillar region}          Allergies   Allergen Reactions     Cefzil [Cefprozil] Hives     Codeine      Extremely sensitive to this drug - be careful with dose     Sulfa Antibiotics Swelling     Vancomycin      Amoxicillin Rash     Nystatin Rash     Patient Active Problem List   Diagnosis     Asthma     Mixed anxiety depressive disorder     Hx borderline personality disorder (H)       Review of Systems:  Pertinent review of systems as reflected in HPI, otherwise negative.     Objective  Objective    Physical Exam:  Vitals:    05/16/23 1150   BP: 107/68   Pulse: 79   Resp: 16   Temp: 98.1  F (36.7  C)   TempSrc: Tympanic   SpO2: 98%   Weight: 61.7 kg (136 lb)      General: Alert and oriented, no acute distress, Vital signs reviewed: afebrile,  normotensive   Psy/mental status: Cooperative, nonanxious  SKIN: Intact, no rashes  EYES: EOMs intact, PERRLA bilaterally   Conjunctiva: Clear bilaterally, no injection or erythema present  EARS: TMs intact, translucent gray in color with normal landmarks present no erythema  or bulging tympanic membrane   Canals are without swelling, however have a mild amount of cerumen, no impaction  NOSE:  mucosa erythematous bilaterally with a mild amount of  rhinorrhea, clear  discharge}               No frontal or maxillary sinus tenderness present bilaterally  MOUTH/THROAT: lips, tongue, & oral mucosa appear normal upon inspection                Posterior oropharynx is erythematous but without exudate, lesions or tonsillar  Edema, no dysphonia, no unilateral tonsillar edema, no uvular deviation,   no signs of peritonsillar abscess  NECK: supple, has full range of motion with no meningeal signs              No lymphadenopathy present  LUNG: normal work of breathing, good respiratory effort without retractions, good air  movement, non labored, inspection reveals normal chest expansion w/  inspiration            Lung sounds are clear to auscultation bilaterally,            No rales/rhonic/crackles wheezing noted           No cough noted       LABS:   Results for orders placed or performed in visit on 05/16/23   Streptococcus A Rapid Screen w/Reflex to PCR - Clinic Collect     Status: Normal    Specimen: Throat; Swab   Result Value Ref Range    Group A Strep antigen Negative Negative       I explained my diagnostic considerations and recommendations to the patient, who voiced understanding and agreement with the treatment plan.   All questions were answered.   We discussed potential side effects, risks and benefits of any prescribed or recommended therapies, as well as expectations for response to treatments.  Please see AVS for any patient instructions & handouts given.   Patient was advised to contact the Nurse Care Line, their Primary Care provider, Urgent Care, or the Emergency Department if there are new or worsening symptoms, or call 911 for emergencies.        ______________________________________________________________________          Patient Instructions   Diagnosis: viral URI_ upper respiratory infection   Today we did:  Strep - will result in 20-30 min will call with results   Influenza - will come back tomorrow  ,mychart   Plan:     Throat lozenges  "    Fluids: you need to drink lots of fluids: water, electrolytes, broth      Rest: you need lots and lots of rest to get better, you have permission to sleep all day and stay home from work or school until you feel better     Treat the most bothersome symptoms.... see symptoms below.....   Monitor for:     Fever: >101 F that is not relieved w/ tylenol, worsening fevers     Difficulty breathing: shortness of breath, wheezing, chest pain with breathing     Signs of dehydration: Feeling weak, dizzy or that you might faint    Chest pain     You have been fighting this illness for >14 days and are not getting better           Cold and Flu     Unfortunately, <2% of sinus/throat/cough symptoms are caused by a bacteria   However, You are SICK! This is often miserable! And I feel for you!   We cannot make it go away, but lets work to shorten the duration and severity!   Your most bothersome symptom is often where the virus settled in your body:    Nose, throat, or lungs, it may cause cough, sore throat, congestion, runny nose, headache, earache or shortness of breath.   It can also settle in your stomach and cause nausea, vomiting, and diarrhea.   Sometimes it causes generalized symptoms like \"aching all over,\" feeling tired, loss of energy, or loss of appetite.  ------- This illness usually lasts anywhere from 10-14 days ----------- hang in there.         We Treat URIs by helping relieve symptoms     Symptoms: - what is your most bothersome symptom?   Nasal congestion:           Decongestants:                > Pseudoephedrine (Sudafed) 60mg every 4 hours (not before bedtime)      Children >4yrs old: 15mg every 4hours chew (1mg/kg every 6hrs)       Liquid: Children's Silfedrine: 15 mg/5 mL      children >2 years old Phenylephrine (sudafed PE child)             Antihistamines:    > chlorpheniramine 4mg Q4hrs -or- clemastine 1mg twice a day (no more than 7 days)      Children >2yrs old: Claritin or zyrtec      >> add " ibuprofen or tylenol for added effect   cough:          antitussives :: suppresses cough by topical anesthetic action on the respiratory stretch receptor    tessalon perles / Benzonatate - need prescription      >only in children >10yrs of age          Expectorants  ::increase respiratory tract clearance of mucus by adding hydration/viscosity causing thinning and loosening   Guaifenesin AND Dextromethorphan :: [adds cough Suppressant] inhibits cough reflex by decreasing cough receptors (relieves the irritating  dry cough)   Robitussin DM / Mucinex DM   Guaifenesin 200 to 400 mg // dextromethorphan 10 to 20 mg every 4 hours,   Combo tabs: 1-2 tabs every 12hrs         Children 4yr to <6 years: Dextromethorphan 2.5 to 5 mg with Guaifenesin 50 to 100 mg every  4 hours as needed  sore throat:   gargle saltwater, honey, warm fluids          cough drops or lozenges:    Cepacol Lozenge / Benzocaine     Children >5yrs old : one lozenge every 2 hours   Herbal:    - honey = good for cough suppressant    - zinc = 2-3mg lozenge Q2hrs    - menthol vapor rup on chest before sleep

## 2023-05-16 NOTE — LETTER
May 16, 2023      Sivakumar Alonzo  [address] [place] [id number]        To Whom It May Concern:    Sivakumar Alonzo was seen in our clinic. He may return to work on 5/17/23    Sincerely,        CARLOS ALBERTO Murphy CNP

## 2023-05-16 NOTE — PATIENT INSTRUCTIONS
"Diagnosis: viral URI_ upper respiratory infection   Today we did:  Strep - will result in 20-30 min will call with results   Influenza - will come back tomorrow  ,elidia   Plan:   Throat lozenges   Fluids: you need to drink lots of fluids: water, electrolytes, broth    Rest: you need lots and lots of rest to get better, you have permission to sleep all day and stay home from work or school until you feel better   Treat the most bothersome symptoms.... see symptoms below.....   Monitor for:   Fever: >101 F that is not relieved w/ tylenol, worsening fevers   Difficulty breathing: shortness of breath, wheezing, chest pain with breathing   Signs of dehydration: Feeling weak, dizzy or that you might faint  Chest pain   You have been fighting this illness for >14 days and are not getting better           Cold and Flu     Unfortunately, <2% of sinus/throat/cough symptoms are caused by a bacteria   However, You are SICK! This is often miserable! And I feel for you!   We cannot make it go away, but lets work to shorten the duration and severity!   Your most bothersome symptom is often where the virus settled in your body:    Nose, throat, or lungs, it may cause cough, sore throat, congestion, runny nose, headache, earache or shortness of breath.   It can also settle in your stomach and cause nausea, vomiting, and diarrhea.   Sometimes it causes generalized symptoms like \"aching all over,\" feeling tired, loss of energy, or loss of appetite.  ------- This illness usually lasts anywhere from 10-14 days ----------- hang in there.         We Treat URIs by helping relieve symptoms     Symptoms: - what is your most bothersome symptom?   Nasal congestion:           Decongestants:                > Pseudoephedrine (Sudafed) 60mg every 4 hours (not before bedtime)      Children >4yrs old: 15mg every 4hours chew (1mg/kg every 6hrs)       Liquid: Children's Silfedrine: 15 mg/5 mL      children >2 years old Phenylephrine (sudafed PE " child)             Antihistamines:    > chlorpheniramine 4mg Q4hrs -or- clemastine 1mg twice a day (no more than 7 days)      Children >2yrs old: Claritin or zyrtec      >> add ibuprofen or tylenol for added effect   cough:          antitussives :: suppresses cough by topical anesthetic action on the respiratory stretch receptor    tessalon perles / Benzonatate - need prescription      >only in children >10yrs of age          Expectorants  ::increase respiratory tract clearance of mucus by adding hydration/viscosity causing thinning and loosening   Guaifenesin AND Dextromethorphan :: [adds cough Suppressant] inhibits cough reflex by decreasing cough receptors (relieves the irritating  dry cough)   Robitussin DM / Mucinex DM   Guaifenesin 200 to 400 mg // dextromethorphan 10 to 20 mg every 4 hours,   Combo tabs: 1-2 tabs every 12hrs         Children 4yr to <6 years: Dextromethorphan 2.5 to 5 mg with Guaifenesin 50 to 100 mg every  4 hours as needed  sore throat:   gargle saltwater, honey, warm fluids          cough drops or lozenges:    Cepacol Lozenge / Benzocaine     Children >5yrs old : one lozenge every 2 hours   Herbal:    - honey = good for cough suppressant    - zinc = 2-3mg lozenge Q2hrs    - menthol vapor rup on chest before sleep

## 2023-09-01 ENCOUNTER — OFFICE VISIT (OUTPATIENT)
Dept: URGENT CARE | Facility: URGENT CARE | Age: 28
End: 2023-09-01
Payer: COMMERCIAL

## 2023-09-01 ENCOUNTER — ANCILLARY PROCEDURE (OUTPATIENT)
Dept: GENERAL RADIOLOGY | Facility: CLINIC | Age: 28
End: 2023-09-01
Attending: PHYSICIAN ASSISTANT
Payer: COMMERCIAL

## 2023-09-01 VITALS
BODY MASS INDEX: 19.25 KG/M2 | RESPIRATION RATE: 16 BRPM | WEIGHT: 138 LBS | OXYGEN SATURATION: 98 % | DIASTOLIC BLOOD PRESSURE: 75 MMHG | TEMPERATURE: 97.7 F | HEART RATE: 61 BPM | SYSTOLIC BLOOD PRESSURE: 115 MMHG

## 2023-09-01 DIAGNOSIS — S69.91XA WRIST INJURY, RIGHT, INITIAL ENCOUNTER: Primary | ICD-10-CM
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
## 2023-09-01 DIAGNOSIS — Z87.81 HISTORY OF FRACTURE OF WRIST: ICD-10-CM
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
## 2023-09-01 DIAGNOSIS — S69.92XA WRIST INJURY, LEFT, INITIAL ENCOUNTER: ICD-10-CM

## 2023-09-01 PROCEDURE — 99213 OFFICE O/P EST LOW 20 MIN: CPT | Performed by: PHYSICIAN ASSISTANT

## 2023-09-01 PROCEDURE — 73110 X-RAY EXAM OF WRIST: CPT | Mod: TC | Performed by: RADIOLOGY

## 2023-09-01 NOTE — LETTER
September 1, 2023      Sivakumar Alonzo  [address] [place] [id number]        To Whom It May Concern:    Sivakumar Alonzo  was seen on 9/1/2023.  Please excuse him.        Sincerely,        Debra Simpson PA-C

## 2023-09-01 NOTE — PROGRESS NOTES
Chief Complaint   Patient presents with    Urgent Care    Fall     Per patient states while at work this morning he slip on the roof and fell on his right side now having pain.        Xray- I see no obvious fx    Results for orders placed or performed in visit on 09/01/23   XR Wrist Right G/E 3 Views     Status: None    Narrative    EXAM: XR WRIST RIGHT G/E 3 VIEWS  DATE/TIME: 9/1/2023 2:16 PM     INDICATION: Right wrist pain.  COMPARISON: 2/5/2019.      Impression    IMPRESSION: Normal joint spacing and alignment.  No fracture.    MARIA L JURADO MD         SYSTEM ID:  [id number]         ASSESSMENT:    ICD-10-CM    1. Wrist injury, left, initial encounter  S69.92XA CANCELED: XR Wrist Left G/E 3 Views      2. Fall, initial encounter  W19.XXXA CANCELED: XR Wrist Left G/E 3 Views      3. Work related injury  Y99.0 CANCELED: XR Wrist Left G/E 3 Views      4. History of fracture of wrist  Z87.81               PLAN: Likely contusion. Splint, ice, elevate, ibu, ortho 1-2 weeks    Advised about symptoms which might herald more serious problems.            Debra Simpson PA-C      SUBJECTIVE:   Sivakumar Alonzo is an 28 year old male who presents with right wrist injury that occurred this morning.  Was at work pulling traps for squirrels off the roof and he slipped on the shingles as it had rained.  He fell backwards.  R wrist fracture 2019.    Allergies   Allergen Reactions    Cefzil [Cefprozil] Hives    Codeine      Extremely sensitive to this drug - be careful with dose    Sulfa Antibiotics Swelling    Vancomycin     Amoxicillin Rash    Nystatin Rash       Past Medical History:   Diagnosis Date    Depressive disorder Unknown    Uncomplicated asthma Forever       benzocaine (CHLORASEPTIC) 15 MG lozenge, Place 1 lozenge inside cheek every 2 hours as needed for sore throat (Patient not taking: Reported on 9/1/2023)    No current facility-administered medications on file prior to visit.      Social History     Tobacco Use     Smoking status: Never    Smokeless tobacco: Never   Vaping Use    Vaping Use: Never used   Substance Use Topics    Alcohol use: No    Drug use: No       ROS:  Gen: no fevers  Musculoskel: + as above  Skin: as above    OBJECTIVE:  /75   Pulse 61   Temp 97.7  F (36.5  C) (Tympanic)   Resp 16   Wt 62.6 kg (138 lb)   SpO2 98%   BMI 19.25 kg/m     General:   awake, alert, and cooperative.  NAD.   Head: Normocephalic, atraumatic.  Eyes: Conjunctiva clear,   MS: Left wrist with tenderness radial ulnar area.  Full range of motion of the wrist in all planes.  Able to move all fingers fully.  No obvious swelling or bruising. cap refill intact, radial pulse intact  Neuro: Alert and oriented - normal speech.      Debra Simpson PA-C

## 2023-09-16 ENCOUNTER — HEALTH MAINTENANCE LETTER (OUTPATIENT)
Age: 28
End: 2023-09-16

## 2023-10-02 ENCOUNTER — NURSE TRIAGE (OUTPATIENT)
Dept: FAMILY MEDICINE | Facility: CLINIC | Age: 28
End: 2023-10-02
Payer: COMMERCIAL

## 2023-10-02 NOTE — TELEPHONE ENCOUNTER
"Nurse Triage SBAR    Is this a 2nd Level Triage? NO    Situation: Patient calling about back pain.    Background: Patient had recent fall from roof. See  visit notes 9/1/2023. Patient states for two weeks now he has had lower back pain that is getting worse. He is unsure if this back pain is related to the fall or from sleeping wrong.     Assessment: Patient reports pain is located in lower back midline and sometimes the right side will hurt. The pain does not radiate elsewhere. Pain is constant and at 2/10. Depending if he's sitting or laying down, sometimes the pain is 8/10 where he can barely move. Patient denies having any numbness or weakness in extremities or loss of bladder/bowel function. He has tried using heat and pain topicals but nothing has helped.     Protocol Recommended Disposition:   See PCP Within 3 Days    Recommendation: Appointment scheduled for 10/4 for further evaluation of back pain. RN advised to be seen sooner in UC/ED if new or worsening symptoms develop. Patient in agreement with plan. No further questions or concerns.    MARK Trinh  [facility] Primary Care Triage        Reason for Disposition   [1] After 1 week (7 days) AND [2] still painful or swollen    Answer Assessment - Initial Assessment Questions  1. MECHANISM: \"How did the injury happen?\" (Consider the possibility of domestic violence or elder abuse)      Possible from previous fall or from sleeping wrong way  2. ONSET: \"When did the injury happen?\" (Minutes or hours ago)      About 2 weeks ago  3. LOCATION: \"What part of the back is injured?\"      Lower back, midline but can go to right side  4. SEVERITY: \"Can you move the back normally?\"      Not really  5. PAIN: \"Is there any pain?\" If Yes, ask: \"How bad is the pain?\"   (Scale 1-10; or mild, moderate, severe)      Constantly 2/10, but can be 8/10 when aggravated with different positions  6. CORD SYMPTOMS: Any weakness or numbness of the arms or legs?\"      No  7. " "SIZE: For cuts, bruises, or swelling, ask: \"How large is it?\" (e.g., inches or centimeters)      No  8. TETANUS: For any breaks in the skin, ask: \"When was the last tetanus booster?\"      No cuts in skin  9. OTHER SYMPTOMS: \"Do you have any other symptoms?\" (e.g., abdomen pain, blood in urine)      No  10. PREGNANCY: \"Is there any chance you are pregnant?\" \"When was your last menstrual period?\"        N/A    Protocols used: Back Injury-A-AH    "

## 2023-10-04 ENCOUNTER — ANCILLARY PROCEDURE (OUTPATIENT)
Dept: GENERAL RADIOLOGY | Facility: CLINIC | Age: 28
End: 2023-10-04
Attending: PHYSICIAN ASSISTANT
Payer: COMMERCIAL

## 2023-10-04 ENCOUNTER — OFFICE VISIT (OUTPATIENT)
Dept: FAMILY MEDICINE | Facility: CLINIC | Age: 28
End: 2023-10-04
Payer: COMMERCIAL

## 2023-10-04 VITALS
DIASTOLIC BLOOD PRESSURE: 80 MMHG | OXYGEN SATURATION: 100 % | SYSTOLIC BLOOD PRESSURE: 127 MMHG | BODY MASS INDEX: 19.39 KG/M2 | HEIGHT: 71 IN | WEIGHT: 138.5 LBS | HEART RATE: 62 BPM | TEMPERATURE: 97.7 F | RESPIRATION RATE: 16 BRPM

## 2023-10-04 DIAGNOSIS — M54.50 ACUTE LEFT-SIDED LOW BACK PAIN WITHOUT SCIATICA: ICD-10-CM

## 2023-10-04 DIAGNOSIS — M54.50 ACUTE LEFT-SIDED LOW BACK PAIN WITHOUT SCIATICA: Primary | ICD-10-CM

## 2023-10-04 PROCEDURE — 72070 X-RAY EXAM THORAC SPINE 2VWS: CPT | Mod: TC | Performed by: RADIOLOGY

## 2023-10-04 PROCEDURE — 99213 OFFICE O/P EST LOW 20 MIN: CPT | Performed by: PHYSICIAN ASSISTANT

## 2023-10-04 PROCEDURE — 72100 X-RAY EXAM L-S SPINE 2/3 VWS: CPT | Mod: TC | Performed by: RADIOLOGY

## 2023-10-04 RX ORDER — TIZANIDINE 2 MG/1
2 TABLET ORAL 2 TIMES DAILY PRN
Qty: 20 TABLET | Refills: 0 | Status: SHIPPED | OUTPATIENT
Start: 2023-10-04

## 2023-10-04 ASSESSMENT — ASTHMA QUESTIONNAIRES: ACT_TOTALSCORE: 25

## 2023-10-04 ASSESSMENT — PAIN SCALES - GENERAL: PAINLEVEL: EXTREME PAIN (8)

## 2023-10-04 ASSESSMENT — ENCOUNTER SYMPTOMS: BACK PAIN: 1

## 2023-10-04 NOTE — PROGRESS NOTES
Assessment & Plan     (M54.50) Acute left-sided low back pain without sciatica  (primary encounter diagnosis)  Comment: Patient presents for evaluation of left-sided low back pain.  Discomfort over the left thoracic and lumbar paraspinal musculature.  No midline tenderness.  No evidence of cauda equina.  Patellar reflexes are 2+.  Normal gait.  Discussed with patient x-ray given he did have trauma earlier in the month.  However, trauma did not coincide with onset of discomfort.  Will trial muscle relaxers as well as physical therapy.  Return with any red flags that were discussed.  Plan: XR Lumbar Spine 2/3 Views, XR Thoracic Spine 2         Views, Physical Therapy Referral, Spine          Referral, tiZANidine (ZANAFLEX) 2 MG         tablet                         RHONDA Monroy [facility]   Sivakumar is a 28 year old, presenting for the following health issues:  Back Pain (Lower back pain that started about two weeks ago. )      History of Present Illness       Back Pain:  He presents for follow up of back pain. Patient's back pain is a new problem.    Original cause of back pain: a fall  First noticed back pain: in the last week  Patient feels back pain: constantlyLocation of back pain:  Right lower back and left lower back  Description of back pain: sharp, shooting and stabbing  Back pain spreads: nowhere    Since patient first noticed back pain, pain is: gradually worsening  Does back pain interfere with his job:  Yes  On a scale of 1-10 (10 being the worst), patient describes pain as:  8  What makes back pain worse: bending, certain positions, lying down, sitting and twisting   Acupuncture: not tried  Acetaminophen: not helpful  Activity or exercise: not helpful  Chiropractor:  Helpful  Cold: not helpful  Heat: not tried  Massage: not helpful  Muscle relaxants: not tried  NSAIDS: not helpful  Opioids: not tried  Physical Therapy: not tried  Rest: not  "helpful  Steroid Injection: not tried  Stretching: not helpful  Surgery: not tried  TENS unit: not tried  Topical pain relievers: not helpful  Other healthcare providers patient is seeing for back pain: Chiropractor    He eats 2-3 servings of fruits and vegetables daily.He consumes 2 sweetened beverage(s) daily.He exercises with enough effort to increase his heart rate 9 or less minutes per day.  He exercises with enough effort to increase his heart rate 5 days per week.   He is taking medications regularly.     Patient on 9/1/2023 sustained a wrist injury when he slid off a 6 foot elevated platform of a roof onto concrete.  He did not develop back pain until 9/14/2023.  No loss of bowel or bladder control.  No new trauma to the back coinciding with onset of symptoms.  No fevers.  No prior surgeries to the back.  Pain is relieved when sitting up straight.  Pain is exacerbated with twisting and laying down.  Indicates discomfort over the left lumbar and thoracic paraspinal musculature.  No rash.         Review of Systems   Musculoskeletal:  Positive for back pain.            Objective    /80   Pulse 62   Temp 97.7  F (36.5  C) (Tympanic)   Resp 16   Ht 1.803 m (5' 11\")   Wt 62.8 kg (138 lb 8 oz)   SpO2 100%   BMI 19.32 kg/m    Body mass index is 19.32 kg/m .  Physical Exam   GENERAL: healthy, alert and no distress  RESP: lungs clear to auscultation - no rales, rhonchi or wheezes  CV: regular rate and rhythm, normal S1 S2, no S3 or S4, no murmur, click or rub, no peripheral edema and peripheral pulses strong  MS: Mild tenderness over the left lumbar paraspinal musculature as well as the right thoracic paraspinal musculature.  No midline CTL tenderness.  No overlying skin changes.  5/5 knee extension/flexion.  5/5 plantar/dorsiflexion.  Patellar reflexes 2+.  Normal gait.         Answers submitted by the patient for this visit:  Back Pain Visit Questionnaire (Submitted on 10/4/2023)  Your back pain is: " new  New Back Pain Visit Questionnaire  (Submitted on 10/4/2023)  What do you think is the original cause of your back pain?: a fall  When did you first notice your back pain? : in the last week  How would you describe your back pain? : sharp, shooting, stabbing  How often do you feel your back pain? : constantly  Where is your back pain located? : right lower back, left lower back  Where does your back pain spread? : nowhere  Since you noticed your back pain, how has it changed? : gradually worsening  Does your back pain interfere with your job?: Yes  On a scale of 1-10 (10 being the worst), how strong is your back pain?: 8  What makes your back pain worse? : bending, certain positions, lying down, sitting, twisting  Acupuncture:: not tried  Acetaminophen: not helpful  Activity or Exercise: not helpful  Chiropractor: helpful  Cold: not helpful  Heat: not tried  Massage: not helpful  Muscle relaxants : not tried  NSAIDS (Ibuprofen, Naproxen) : not helpful  Opioids: not tried  Physical Therapy: not tried  Rest: not helpful  Steroid Injection: not tried  Stretching : not helpful  Surgery: not tried  TENS Unit: not tried  Topical pain relievers : not helpful  Do you see any other healthcare providers for your back pain? : Chiropractor  General Questionnaire (Submitted on 10/4/2023)  Chief Complaint: Chronic problems general questions HPI Form  How many servings of fruits and vegetables do you eat daily?: 2-3  On average, how many sweetened beverages do you drink each day (Examples: soda, juice, sweet tea, etc.  Do NOT count diet or artificially sweetened beverages)?: 2  How many minutes a day do you exercise enough to make your heart beat faster?: 9 or less  How many days a week do you exercise enough to make your heart beat faster?: 5  How many days per week do you miss taking your medication?: 0

## 2023-10-04 NOTE — COMMUNITY RESOURCES LIST (ENGLISH)
10/04/2023   [facility]  N/A  For questions about this resource list or additional care needs, please contact your primary care clinic or care manager.  Phone: [phone]   Email: N/A   Address: [address] [place] [address]   Hours: N/A        Financial Stability       Utility payment assistance  1  [facility] [employer] Office - [facility] Distance: 2.97 miles      Phone/Virtual   [address] [facility] [address] [place] [address]  Language: English  Hours: Mon - Fri 8:30 AM - 12:00 PM , Mon - Fri 1:00 PM - 4:00 PM  Fees: Free   Phone: (807) 578-4471 Email: kavita@[facility] TexasTemporal Power.ADTZ Website: https://www.[facility].org/usn/     2  [facility] Community [employer] ([facility]) - Energy Assistance Program Distance: 2.97 miles      In-Person, Phone/Virtual   [address] [address]  Language: English  Hours: Mon - Fri 8:00 AM - 4:30 PM  Fees: Free   Phone: (679) 540-3422 Email: accap@accap.org Website: http://www.accap.org          Hotlines and Helplines       Hotline - Housing crisis  3  [facility] Housing Resource Line Distance: 5.73 miles      Phone/Virtual   [address] [place] [address]  Language: English  Hours: Mon - Sun Open 24 Hours   Phone: (856) 183-5481 Website: https://www.[facility]./2689/Basic-Needs     4  [facility] Distance: 14.26 miles      Phone/Virtual   [address] [place] [address]  Language: English  Hours: Mon - Sun Open 24 Hours   Phone: (596) 521-2532 Email: communications@[facility].org Website: https://[facility].org/oursaviourshousing/          Housing       Coordinated Entry access point  5  [facility] [employer] Office - [facility] Distance: 2.97 miles      Phone/Virtual   [address] [address]  Language: English  Hours: Mon - Fri 8:30 AM - 12:00 PM , Mon - Fri 1:00 PM - 4:00 PM  Fees: Free   Phone: (780) 665-1690 Ext.2 Email:  kavita@[facility].salvationarmy.org Website: https://www.[facility]vation[facility]yusa.org/usn/     6  [employer] [facility] ([facility]) - Housing Services Distance: 14.36 miles      In-Person   [address] [place] [address]  Language: English  Hours: Mon - Fri 9:00 AM - 5:00 PM  Fees: Free   Phone: (407) 518-8221 Email: housing@[facility].org Website: http://www.[facility].org/housing     Drop-in center or day shelter  7  [employer] Distance: 12.65 miles      In-Person   [address] [place] [address]  Language: English, Hmong, Solomon Islander, Marshallese  Hours: Mon - Thu 8:30 AM - 4:30 PM , Sat - Sun 9:00 AM - 12:00 PM  Fees: Free   Phone: (216) 423-1852 Email: info@[employer] Website: https://[employer].org/     8  [facility] Distance: 13.83 miles      In-Person   [address] [place] [address]  Language: English, Solomon Islander, Marshallese  Hours: Mon - Sat 7:00 AM - 3:00 PM  Fees: Free, Self Pay   Phone: (866) 309-8074 Email: info@[employer] Website: https://www.[employer].org/locations/opportunity-center/     Housing search assistance  9  [facility] Community Action Program[employer] ([facility]) - [employer] Rental Housing Distance: 2.97 miles      In-Person, Phone/Virtual   [address] [address]  Language: English  Hours: Mon - Fri 8:00 AM - 4:30 PM  Fees: Free   Phone: (230) 219-3537 Email: accap@accap.org Website: http://www.accap.org     10  [employer] ([employer]) Distance: 6.92 miles      Phone/Virtual   [address] [place] [address]  Language: English, Marshallese  Hours: Mon - Fri 9:00 AM - 5:00 PM  Fees: Free   Phone: (376) 551-4482 Email: services@[employer] Website: https://www.[employer]     Shelter for families  11  [facility] Distance: 13.88 miles      In-Person   [address] [facility] [place] [address]  Language: English   Hours: Mon - Fri 3:00 PM - 9:00 AM , Sat - Sun Open 24 Hours  Fees: Free   Phone: (670) 511-8765 Ext.1 Website: https://www.[facility]/2020/07/03/emergency-family-shelter/     Shelter for individuals  12  [facility] Distance: 12.97 miles      In-Person   [address] [place] [address]  Language: English  Hours: Mon - Sun 5:00 PM - 10:00 AM  Fees: Free, Self Pay   Phone: (498) 744-4973 Email: info@[employer] Website: https://www.[employer].[facility]/locations/higher-ground-shelter/     13  [facility] Distance: 13 miles      In-Person   [address] [place] [address]  Language: English  Hours: Mon - Fri 4:00 PM - 9:00 AM  Fees: Free   Phone: (969) 980-5745 Email: yessi@[facility].[employer].org Website: https://centralusa.[facility] Hospitalationarmy.org/northern/[facility]/          Important Numbers & Websites       Emergency Services   911  [facility] Services   311  Poison Control   (522) 908-9926  Suicide Prevention Lifeline   (365) 147-9571 (TALK)  Child Abuse Hotline   (335) 234-7311 (4-A-Child)  Sexual Assault Hotline   (300) 515-8738 (HOPE)  National Runaway Safeline   (259) 604-8697 (RUNAWAY)  All-Options Talkline   (321) 890-1782  Substance Abuse Referral   (592) 992-2745 (HELP)

## 2023-10-05 ENCOUNTER — ANCILLARY PROCEDURE (OUTPATIENT)
Dept: GENERAL RADIOLOGY | Facility: CLINIC | Age: 28
End: 2023-10-05
Attending: PHYSICIAN ASSISTANT
Payer: COMMERCIAL

## 2023-10-05 ENCOUNTER — OFFICE VISIT (OUTPATIENT)
Dept: ORTHOPEDICS | Facility: CLINIC | Age: 28
End: 2023-10-05
Payer: OTHER MISCELLANEOUS

## 2023-10-05 VITALS — WEIGHT: 138 LBS | HEIGHT: 71 IN | BODY MASS INDEX: 19.32 KG/M2
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
## 2023-10-05 DIAGNOSIS — S69.91XA WRIST INJURY, RIGHT, INITIAL ENCOUNTER: ICD-10-CM
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
## 2023-10-05 DIAGNOSIS — Z87.81 HISTORY OF FRACTURE OF WRIST: ICD-10-CM

## 2023-10-05 DIAGNOSIS — S63.501A SPRAIN OF RIGHT WRIST, INITIAL ENCOUNTER: Primary | ICD-10-CM

## 2023-10-05 PROCEDURE — 99203 OFFICE O/P NEW LOW 30 MIN: CPT | Performed by: ORTHOPAEDIC SURGERY

## 2023-10-05 PROCEDURE — 73110 X-RAY EXAM OF WRIST: CPT | Mod: TC | Performed by: RADIOLOGY

## 2023-10-05 ASSESSMENT — PAIN SCALES - GENERAL: PAINLEVEL: MODERATE PAIN (5)

## 2023-10-05 NOTE — PROGRESS NOTES
Chief Complaint:   Chief Complaint   Patient presents with    Right Wrist - Pain     Work Comp. Right wrist injury. DOI: 9/1/23. Slipped on roof on wet spot and fell on outstretched hand. Was seen in . He was doing better, but last 2 weeks he's had an increase in ulnar sided wrist pain with pushing, mainly. Hx of pisiform fracture on this wrist in 2019. He is left hand dominant.       INJURY: right wrist  DATE of INJURY: 9/1/2023      HPI: Sivakumar Alonzo is a 28 year old male , left -hand dominant, who presents for evaluation and management of a right wrist injury. He injured his hand on 9/1/2023 while slipping on a roof and falling backwards on the roof onto his buttocks andl on outstretched hand to break his fall. He did not fall off the roof. He wsa seen in Urgent Care with xrays negative for fracture. He wsa doing better, but 2 weeks ago had increased pain along the outer aspect of the wrist with pushing. Ok at rest and keeping wrist straight. Pain with bending.  No numbness and tingling.    Treatment has been tylenol, ibuprofen without relief. No use of a brace.    History of a pisiform fracture right wrist 2019.     .   It has been 5 weeks since the initial injury.      He reports having mild pain/discomfort around the injury site. He denies associated numbness or tingling. He denies any other injuries to his upper extremity.   Symptoms: pain.  Location of symptoms: ulnar wrist.  Pain severity: 3/10  Pain quality: sharp and shooting  Frequency of symptoms: occasionally  Aggravating factors: bending, twisting.  Relieving factors: rest, keeping wrist straight..    Previous treatment: over the counter medications.    Past medical history:  has a past medical history of Depressive disorder (Unknown) and Uncomplicated asthma (Forever).    He has no past medical history of Arthritis, Cancer (H), Cerebral infarction (H), Congestive heart failure (H), COPD (chronic obstructive pulmonary disease) (H), Diabetes (H),  "Heart disease, History of blood transfusion, Hypertension, or Thyroid disease.     Past surgical history:  has no past surgical history on file.     Medications:    Current Outpatient Medications   Medication Sig Dispense Refill    benzocaine (CHLORASEPTIC) 15 MG lozenge Place 1 lozenge inside cheek every 2 hours as needed for sore throat 12 lozenge 0    tiZANidine (ZANAFLEX) 2 MG tablet Take 1 tablet (2 mg) by mouth 2 times daily as needed for muscle spasms 20 tablet 0        Allergies:     Allergies   Allergen Reactions    Cefzil [Cefprozil] Hives    Codeine      Extremely sensitive to this drug - be careful with dose    Sulfa Antibiotics Swelling    Vancomycin     Amoxicillin Rash    Nystatin Rash        Family History: family history is not on file.     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 He has never used smokeless tobacco. He reports that he does not drink alcohol and does not use drugs.    Review of Systems:  ROS: 10 point ROS neg other than the symptoms noted above in the HPI and past medical history.    Physical Exam  GENERAL APPEARANCE: healthy, alert, no distress.   SKIN: no suspicious lesions or rashes  NEURO: Normal strength and tone, mentation intact and speech normal  PSYCH:  mentation appears normal and affect normal. Not anxious.  RESPIRATORY: No increased work of breathing.    Ht 1.803 m (5' 11\")   Wt 62.6 kg (138 lb)   BMI 19.25 kg/m       right WRIST/HAND EXAM:      Skin intact. No abnormal skin discoloration, erythema or ecchymosis.   Normal wear pattern, color and tone.    There is no swelling in the wrist, hand.  There is mild tenderness in the dorsal aspect ulnar wrist  Nontender to palpation ulnar styloid, ECU, DRUJ  There is no ecchymosis.  There is no erythema of the surrounding skin.  There is no maceration of the skin.  There is no gross deformity in the area.    Intact sensation light touch median, radial, ulnar nerves of the hand  Intact sensation " to the radial and ulnar digital nerves of the fingers, as well as the finger tips.  Intact epl fpl fdp edc wrist flexion/extension biceps/triceps deltoid  Brisk capillary refill to all fingers.   Palpable radial pulse, 2+.    X-rays:  3 views right wrist from 10/5/2023 were reviewed in clinic today. On my review, No obvious fracture or dislocation. No obvious bony abnormality or lesion..     3 views right wrist 9/1/2023 -- No obvious fracture or dislocation. No obvious bony abnormality or lesion.     Assessment: 29yo RHD  male with right wrist pain, sprain, status post fall injury at work..    Plan:  Exam, images reviewed. No obvous fracture now on repeat xrays 5 weeks later.  Likely wrist sprain  Recommend treatment with brace immobilization x4 weeks  Over the counter medications  Activity modification  Return to clinic 4 weeks, sooner if needed, for clinical recheck. No xrays needed.    * All questions were addressed and answered prior to discharge from clinic today. The patient acknowledges an understanding of and agreement with the plan set forth during today's visit. Patient was advised to call our office or MyChart us if any further questions arise upon leaving our office today.        Raheel Cordero M.D., M.S.  Dept. of Orthopaedic Surgery  [facility]

## 2023-10-05 NOTE — LETTER
10/5/2023         RE: Sivakumar Alonzo  [address]   [place] [address]        Dear Colleague,    Thank you for referring your patient, Sivakumar Alonzo, to the [facility]. Please see a copy of my visit note below.    Chief Complaint:   Chief Complaint   Patient presents with     Right Wrist - Pain     Work Comp. Right wrist injury. DOI: 9/1/23. Slipped on roof on wet spot and fell on outstretched hand. Was seen in . He was doing better, but last 2 weeks he's had an increase in ulnar sided wrist pain with pushing, mainly. Hx of pisiform fracture on this wrist in 2019. He is left hand dominant.       INJURY: right wrist  DATE of INJURY: 9/1/2023      HPI: Sivakumar Alonzo is a 28 year old male , left -hand dominant, who presents for evaluation and management of a right wrist injury. He injured his hand on 9/1/2023 while slipping on a roof and falling backwards on the roof onto his buttocks andl on outstretched hand to break his fall. He did not fall off the roof. He wsa seen in Urgent Care with xrays negative for fracture. He wsa doing better, but 2 weeks ago had increased pain along the outer aspect of the wrist with pushing. Ok at rest and keeping wrist straight. Pain with bending.  No numbness and tingling.    Treatment has been tylenol, ibuprofen without relief. No use of a brace.    History of a pisiform fracture right wrist 2019.     .   It has been 5 weeks since the initial injury.      He reports having mild pain/discomfort around the injury site. He denies associated numbness or tingling. He denies any other injuries to his upper extremity.   Symptoms: pain.  Location of symptoms: ulnar wrist.  Pain severity: 3/10  Pain quality: sharp and shooting  Frequency of symptoms: occasionally  Aggravating factors: bending, twisting.  Relieving factors: rest, keeping wrist straight..    Previous treatment: over the counter medications.    Past medical history:  has a past medical  "history of Depressive disorder (Unknown) and Uncomplicated asthma (Forever).    He has no past medical history of Arthritis, Cancer (H), Cerebral infarction (H), Congestive heart failure (H), COPD (chronic obstructive pulmonary disease) (H), Diabetes (H), Heart disease, History of blood transfusion, Hypertension, or Thyroid disease.     Past surgical history:  has no past surgical history on file.     Medications:    Current Outpatient Medications   Medication Sig Dispense Refill     benzocaine (CHLORASEPTIC) 15 MG lozenge Place 1 lozenge inside cheek every 2 hours as needed for sore throat 12 lozenge 0     tiZANidine (ZANAFLEX) 2 MG tablet Take 1 tablet (2 mg) by mouth 2 times daily as needed for muscle spasms 20 tablet 0        Allergies:     Allergies   Allergen Reactions     Cefzil [Cefprozil] Hives     Codeine      Extremely sensitive to this drug - be careful with dose     Sulfa Antibiotics Swelling     Vancomycin      Amoxicillin Rash     Nystatin Rash        Family History: family history is not on file.     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 He has never used smokeless tobacco. He reports that he does not drink alcohol and does not use drugs.    Review of Systems:  ROS: 10 point ROS neg other than the symptoms noted above in the HPI and past medical history.    Physical Exam  GENERAL APPEARANCE: healthy, alert, no distress.   SKIN: no suspicious lesions or rashes  NEURO: Normal strength and tone, mentation intact and speech normal  PSYCH:  mentation appears normal and affect normal. Not anxious.  RESPIRATORY: No increased work of breathing.    Ht 1.803 m (5' 11\")   Wt 62.6 kg (138 lb)   BMI 19.25 kg/m       right WRIST/HAND EXAM:      Skin intact. No abnormal skin discoloration, erythema or ecchymosis.   Normal wear pattern, color and tone.    There is no swelling in the wrist, hand.  There is mild tenderness in the dorsal aspect ulnar wrist  Nontender to palpation " ulnar styloid, ECU, DRUJ  There is no ecchymosis.  There is no erythema of the surrounding skin.  There is no maceration of the skin.  There is no gross deformity in the area.    Intact sensation light touch median, radial, ulnar nerves of the hand  Intact sensation to the radial and ulnar digital nerves of the fingers, as well as the finger tips.  Intact epl fpl fdp edc wrist flexion/extension biceps/triceps deltoid  Brisk capillary refill to all fingers.   Palpable radial pulse, 2+.    X-rays:  3 views right wrist from 10/5/2023 were reviewed in clinic today. On my review, No obvious fracture or dislocation. No obvious bony abnormality or lesion..     3 views right wrist 9/1/2023 -- No obvious fracture or dislocation. No obvious bony abnormality or lesion.     Assessment: 27yo RHD  male with right wrist pain, sprain, status post fall injury at work..    Plan:  Exam, images reviewed. No obvous fracture now on repeat xrays 5 weeks later.  Likely wrist sprain  Recommend treatment with brace immobilization x4 weeks  Over the counter medications  Activity modification  Return to clinic 4 weeks, sooner if needed, for clinical recheck. No xrays needed.    * All questions were addressed and answered prior to discharge from clinic today. The patient acknowledges an understanding of and agreement with the plan set forth during today's visit. Patient was advised to call our office or MyChart us if any further questions arise upon leaving our office today.        Raheel Cordero M.D., M.S.  Dept. of Orthopaedic Surgery  [facility]         Again, thank you for allowing me to participate in the care of your patient.        Sincerely,        Raheel Cordero MD

## 2023-10-05 NOTE — NURSING NOTE
Patient was fitted with a short arm right wrist brace. All questions were answered to patient's satisfaction. DME form explained, signed, and copy given to the patient for their records.   Anne Allen Clinical Medical Assistant

## 2023-10-14 NOTE — PROGRESS NOTES
SUBJECTIVE:                                                    Sivakumar Alonzo is a 22 year old male who presents to clinic today for the following health issues:      Joint Pain     Onset: Sunday- was helping his dad lift a boat over the fence- thinks it is from over use. Did not hear a pop or other feeling.     Description:   Location: left arm    Intensity: moderate- rated 5-7.5/10, no pain at rest.    Progression of Symptoms: same    Accompanying Signs & Symptoms:  Other symptoms: radiation of pain from inner L  elbow to forearm, weakness in left hand and swelling   History:   Previous similar pain: no       Precipitating factors:   Trauma or overuse: YES- lifting heavy things    Alleviating factors:  Improved by: nothing       Therapies Tried and outcome: ice      Problem list and histories reviewed & adjusted, as indicated.  Additional history: as documented    There is no problem list on file for this patient.    History reviewed. No pertinent surgical history.    Social History   Substance Use Topics     Smoking status: Never Smoker     Smokeless tobacco: Not on file     Alcohol use No     History reviewed. No pertinent family history.      Current Outpatient Prescriptions   Medication Sig Dispense Refill     Fexofenadine HCl (ALLEGRA PO)        cyclobenzaprine (FLEXERIL) 10 MG tablet Take 0.5-1 tablets (5-10 mg) by mouth 3 times daily as needed for muscle spasms 30 tablet 1     naproxen (NAPROSYN) 500 MG tablet Take 1 tablet (500 mg) by mouth 2 times daily as needed for moderate pain 30 tablet 1     Allergies   Allergen Reactions     Amoxicillin      Cefzil [Cefprozil]      Nystatin      BP Readings from Last 3 Encounters:   04/04/17 128/81    Wt Readings from Last 3 Encounters:   04/04/17 139 lb 3.2 oz (63.1 kg)           Labs reviewed in EPIC    Reviewed and updated as needed this visit by clinical staff  Tobacco  Allergies  Meds  Med Hx  Surg Hx  Fam Hx  Soc Hx      Reviewed and updated as needed  "this visit by Provider         ROS:  Constitutional, HEENT, cardiovascular, pulmonary, GI, , musculoskeletal, neuro, skin, endocrine and psych systems are negative, except as otherwise noted.    OBJECTIVE:                                                    /81  Pulse 77  Temp 98.2  F (36.8  C) (Oral)  Ht 5' 11.85\" (1.825 m)  Wt 139 lb 3.2 oz (63.1 kg)  SpO2 98%  BMI 18.96 kg/m2  Body mass index is 18.96 kg/(m^2).  GENERAL: healthy, alert and no distress  RESP: lungs clear to auscultation - no rales, rhonchi or wheezes  CV: regular rate and rhythm, normal S1 S2, no S3 or S4, no murmur, click or rub, no peripheral edema and peripheral pulses strong  MS: no gross musculoskeletal defects noted, no edema POSITIVE for pain with palpation of Left inner arm, and passive ROM.   SKIN: no suspicious lesions or rashes  NEURO: A&O, normal strength and tone, mentation intact and speech normal    Diagnostic Test Results:  See orders     ASSESSMENT/PLAN:                                                          ICD-10-CM    1. Left elbow pain M25.522 PHYSICAL THERAPY REFERRAL     CANCELED: XR Elbow Left 2 Views    post lifting a heavy object   2. Muscle strain of right upper arm, initial encounter S46.911A cyclobenzaprine (FLEXERIL) 10 MG tablet     naproxen (NAPROSYN) 500 MG tablet     PHYSICAL THERAPY REFERRAL       See Patient Instructions: I will let you know if the radiologist sees something that I do not. Take naproxen with food for pain. Muscle relaxer if pain is severe- this can make you tired.  I have ordered physical therapy which can help with massage, stretching and strengthening. If your symptoms persist or worsen, we will order further imaging for evaluation, please let me know.      Diana Torres, P  [facility]   " Alert-The patient is alert, awake and responds to voice. The patient is oriented to time, place, and person. The triage nurse is able to obtain subjective information.

## 2023-10-26 NOTE — COMMUNITY RESOURCES LIST (ENGLISH)
10/04/2023   [facility]  N/A  For questions about this resource list or additional care needs, please contact your primary care clinic or care manager.  Phone: [phone]   Email: N/A   Address: [address] [place] [address]   Hours: N/A        Financial Stability       Utility payment assistance  1  [facility] [employer] Office - [facility] Distance: 2.97 miles      Phone/Virtual   [address] [facility] [address] [place] [address]  Language: English  Hours: Mon - Fri 8:30 AM - 12:00 PM , Mon - Fri 1:00 PM - 4:00 PM  Fees: Free   Phone: (298) 126-1326 Email: kavita@[facility] HospitalBest Five Reviewed.Nearlyweds Website: https://www.[employer].org/usn/     2  [facility] Community [employer] ([facility]) - Energy Assistance Program Distance: 2.97 miles      In-Person, Phone/Virtual   [address] [address]  Language: English  Hours: Mon - Fri 8:00 AM - 4:30 PM  Fees: Free   Phone: (533) 467-1022 Email: accap@accap.org Website: http://www.accap.org          Hotlines and Helplines       Hotline - Housing crisis  3  [facility] Housing Resource Line Distance: 5.73 miles      Phone/Virtual   [address] [place] [address]  Language: English  Hours: Mon - Sun Open 24 Hours   Phone: (105) 609-5311 Website: https://www.[facility]./2689/Basic-Needs     4  [facility] Distance: 14.26 miles      Phone/Virtual   [address] [place] [address]  Language: English  Hours: Mon - Sun Open 24 Hours   Phone: (167) 377-8658 Email: communications@[facility].org Website: https://[facility]-[place].org/oursaviourshousing/          Housing       Coordinated Entry access point  5  [facility] [employer] Office - [facility] Distance: 2.97 miles      Phone/Virtual   [address] [address]  Language: English  Hours: Mon - Fri 8:30 AM - 12:00 PM , Mon - Fri 1:00 PM - 4:00 PM  Fees: Free   Phone: (146) 565-4522 Ext.2 Email:  kavita@[facility].salvationarmy.org Website: https://www.salvationarmyusa.org/usn/     6  [employer] [facility] ([facility]) - Housing Services Distance: 14.36 miles      In-Person   [address] [place] [address]  Language: English  Hours: Mon - Fri 9:00 AM - 5:00 PM  Fees: Free   Phone: (647) 283-7677 Email: housing@[facility].org Website: http://www.[facility].org/housing     Drop-in center or day shelter  7  [employer] Distance: 12.65 miles      In-Person   [address] [place] [address]  Language: English, Hmong, Kenyan, Emirati  Hours: Mon - Thu 8:30 AM - 4:30 PM , Sat - Sun 9:00 AM - 12:00 PM  Fees: Free   Phone: (517) 913-6919 Email: info@[employer] Website: https://[employer].org/     8  [facility] Distance: 13.83 miles      In-Person   [address] [place] [address]  Language: English, Kenyan, Emirati  Hours: Mon - Sat 7:00 AM - 3:00 PM  Fees: Free, Self Pay   Phone: (334) 761-5846 Email: info@[employer] Website: https://www.DiscountIF.org/locations/opportunity-center/     Housing search assistance  9  [facility] Community Action Program[employer] ([facility]) - [employer] Rental Housing Distance: 2.97 miles      In-Person, Phone/Virtual   [address] [address]  Language: English  Hours: Mon - Fri 8:00 AM - 4:30 PM  Fees: Free   Phone: (976) 773-6516 Email: accap@accap.org Website: http://www.accap.org     10  [employer] ([employer]) Distance: 6.92 miles      Phone/Virtual   [address] [place] [address]  Language: English, Emirati  Hours: Mon - Fri 9:00 AM - 5:00 PM  Fees: Free   Phone: (624) 171-1705 Email: services@[employer] Website: https://www.SocialDial     Shelter for families  11  [facility] Distance: 13.88 miles      In-Person   [address] [facility] [place] [address]  Language: English   Hours: Mon - Fri 3:00 PM - 9:00 AM , Sat - Sun Open 24 Hours  Fees: Free   Phone: (162) 422-4067 Ext.1 Website: https://www.saintandrews.CardiOx/2020/07/03/emergency-family-shelter/     Shelter for individuals  12  [facility] Distance: 12.97 miles      In-Person   [address] [place] [address]  Language: English  Hours: Mon - Sun 5:00 PM - 10:00 AM  Fees: Free, Self Pay   Phone: (855) 419-1972 Email: info@demandmart Website: https://www.[employer].CardiOx/locations/higher-ground-shelter/     13  [facility] Distance: 13 miles      In-Person   [address] [place] [address]  Language: English  Hours: Mon - Fri 4:00 PM - 9:00 AM  Fees: Free   Phone: (791) 237-5006 Email: yessi@[facility].[employer]ationIntellinote.org Website: https://centralusa.[facility] Hospitalationarmy.org/northern/[facility]/          Important Numbers & Websites       Emergency Services   911  [facility] Services   311  Poison Control   (293) 121-8194  Suicide Prevention Lifeline   (580) 289-1078 (TALK)  Child Abuse Hotline   (613) 292-9325 (4-A-Child)  Sexual Assault Hotline   (296) 523-3984 (HOPE)  National Runaway Safeline   (917) 405-9840 (RUNAWAY)  All-Options Talkline   (475) 666-6439  Substance Abuse Referral   (907) 173-3787 (HELP)     Detail Level: Detailed Depth Of Biopsy: dermis Was A Bandage Applied: Yes Size Of Lesion In Cm: 0 Biopsy Type: H and E Biopsy Method: Dermablade Anesthesia Type: 1% lidocaine with epinephrine Anesthesia Volume In Cc (Will Not Render If 0): 0.5 Hemostasis: Drysol Wound Care: Petrolatum Dressing: bandage Destruction After The Procedure: No Type Of Destruction Used: Curettage Curettage Text: The wound bed was treated with curettage after the biopsy was performed. Cryotherapy Text: The wound bed was treated with cryotherapy after the biopsy was performed. Electrodesiccation Text: The wound bed was treated with electrodesiccation after the biopsy was performed. Electrodesiccation And Curettage Text: The wound bed was treated with electrodesiccation and curettage after the biopsy was performed. Silver Nitrate Text: The wound bed was treated with silver nitrate after the biopsy was performed. Lab: -52 Lab Facility: 3 Consent: Written consent was obtained and risks were reviewed including but not limited to scarring, infection, bleeding, scabbing, incomplete removal, nerve damage and allergy to anesthesia. Post-Care Instructions: I reviewed with the patient in detail post-care instructions. Patient is to keep the biopsy site dry overnight, and then apply bacitracin twice daily until healed. Patient may apply hydrogen peroxide soaks to remove any crusting. Notification Instructions: Patient will be notified of biopsy results. However, patient instructed to call the office if not contacted within 2 weeks. Billing Type: Third-Party Bill Information: Selecting Yes will display possible errors in your note based on the variables you have selected. This validation is only offered as a suggestion for you. PLEASE NOTE THAT THE VALIDATION TEXT WILL BE REMOVED WHEN YOU FINALIZE YOUR NOTE. IF YOU WANT TO FAX A PRELIMINARY NOTE YOU WILL NEED TO TOGGLE THIS TO 'NO' IF YOU DO NOT WANT IT IN YOUR FAXED NOTE. Rifampin Pregnancy And Lactation Text: This medication is Pregnancy Category C and it isn't know if it is safe during pregnancy. It is also excreted in breast milk and should not be used if you are breast feeding.

## 2024-11-09 ENCOUNTER — HEALTH MAINTENANCE LETTER (OUTPATIENT)
Age: 29
End: 2024-11-09